# Patient Record
Sex: FEMALE | Race: OTHER | HISPANIC OR LATINO
[De-identification: names, ages, dates, MRNs, and addresses within clinical notes are randomized per-mention and may not be internally consistent; named-entity substitution may affect disease eponyms.]

---

## 2017-06-28 ENCOUNTER — APPOINTMENT (OUTPATIENT)
Dept: CARDIOLOGY | Facility: CLINIC | Age: 62
End: 2017-06-28

## 2017-07-26 ENCOUNTER — APPOINTMENT (OUTPATIENT)
Dept: CARDIOLOGY | Facility: CLINIC | Age: 62
End: 2017-07-26

## 2017-07-26 VITALS
DIASTOLIC BLOOD PRESSURE: 80 MMHG | WEIGHT: 170 LBS | HEIGHT: 64 IN | BODY MASS INDEX: 29.02 KG/M2 | SYSTOLIC BLOOD PRESSURE: 130 MMHG | HEART RATE: 52 BPM

## 2017-07-26 DIAGNOSIS — R06.02 SHORTNESS OF BREATH: ICD-10-CM

## 2017-08-03 ENCOUNTER — APPOINTMENT (OUTPATIENT)
Dept: CARDIOLOGY | Facility: CLINIC | Age: 62
End: 2017-08-03

## 2017-08-14 ENCOUNTER — APPOINTMENT (OUTPATIENT)
Dept: CARDIOLOGY | Facility: CLINIC | Age: 62
End: 2017-08-14

## 2021-07-04 ENCOUNTER — EMERGENCY (EMERGENCY)
Facility: HOSPITAL | Age: 66
LOS: 1 days | Discharge: HOME | End: 2021-07-04
Attending: EMERGENCY MEDICINE | Admitting: EMERGENCY MEDICINE
Payer: MEDICAID

## 2021-07-04 VITALS
OXYGEN SATURATION: 99 % | RESPIRATION RATE: 18 BRPM | SYSTOLIC BLOOD PRESSURE: 148 MMHG | TEMPERATURE: 99 F | DIASTOLIC BLOOD PRESSURE: 68 MMHG | HEART RATE: 70 BPM

## 2021-07-04 VITALS
RESPIRATION RATE: 17 BRPM | OXYGEN SATURATION: 100 % | TEMPERATURE: 96 F | HEART RATE: 63 BPM | DIASTOLIC BLOOD PRESSURE: 70 MMHG | SYSTOLIC BLOOD PRESSURE: 143 MMHG

## 2021-07-04 LAB
ALBUMIN SERPL ELPH-MCNC: 4.5 G/DL — SIGNIFICANT CHANGE UP (ref 3.3–5)
ALP SERPL-CCNC: 98 U/L — SIGNIFICANT CHANGE UP (ref 40–120)
ALT FLD-CCNC: 30 U/L — SIGNIFICANT CHANGE UP (ref 4–33)
ANION GAP SERPL CALC-SCNC: 16 MMOL/L — HIGH (ref 7–14)
APPEARANCE UR: ABNORMAL
AST SERPL-CCNC: 27 U/L — SIGNIFICANT CHANGE UP (ref 4–32)
BACTERIA # UR AUTO: ABNORMAL
BASOPHILS # BLD AUTO: 0.04 K/UL — SIGNIFICANT CHANGE UP (ref 0–0.2)
BASOPHILS NFR BLD AUTO: 0.4 % — SIGNIFICANT CHANGE UP (ref 0–2)
BILIRUB SERPL-MCNC: 0.3 MG/DL — SIGNIFICANT CHANGE UP (ref 0.2–1.2)
BILIRUB UR-MCNC: NEGATIVE — SIGNIFICANT CHANGE UP
BUN SERPL-MCNC: 15 MG/DL — SIGNIFICANT CHANGE UP (ref 7–23)
CALCIUM SERPL-MCNC: 9.2 MG/DL — SIGNIFICANT CHANGE UP (ref 8.4–10.5)
CHLORIDE SERPL-SCNC: 103 MMOL/L — SIGNIFICANT CHANGE UP (ref 98–107)
CO2 SERPL-SCNC: 20 MMOL/L — LOW (ref 22–31)
COLOR SPEC: YELLOW — SIGNIFICANT CHANGE UP
CREAT SERPL-MCNC: 1.36 MG/DL — HIGH (ref 0.5–1.3)
D DIMER BLD IA.RAPID-MCNC: <150 NG/ML DDU — SIGNIFICANT CHANGE UP
DIFF PNL FLD: NEGATIVE — SIGNIFICANT CHANGE UP
EOSINOPHIL # BLD AUTO: 0.43 K/UL — SIGNIFICANT CHANGE UP (ref 0–0.5)
EOSINOPHIL NFR BLD AUTO: 4.4 % — SIGNIFICANT CHANGE UP (ref 0–6)
EPI CELLS # UR: 1 /HPF — SIGNIFICANT CHANGE UP (ref 0–5)
GLUCOSE SERPL-MCNC: 152 MG/DL — HIGH (ref 70–99)
GLUCOSE UR QL: NEGATIVE — SIGNIFICANT CHANGE UP
HCT VFR BLD CALC: 42.3 % — SIGNIFICANT CHANGE UP (ref 34.5–45)
HGB BLD-MCNC: 13.3 G/DL — SIGNIFICANT CHANGE UP (ref 11.5–15.5)
HYALINE CASTS # UR AUTO: 1 /LPF — SIGNIFICANT CHANGE UP (ref 0–7)
IANC: 5.04 K/UL — SIGNIFICANT CHANGE UP (ref 1.5–8.5)
IMM GRANULOCYTES NFR BLD AUTO: 0.3 % — SIGNIFICANT CHANGE UP (ref 0–1.5)
KETONES UR-MCNC: NEGATIVE — SIGNIFICANT CHANGE UP
LEUKOCYTE ESTERASE UR-ACNC: NEGATIVE — SIGNIFICANT CHANGE UP
LYMPHOCYTES # BLD AUTO: 3.71 K/UL — HIGH (ref 1–3.3)
LYMPHOCYTES # BLD AUTO: 38.2 % — SIGNIFICANT CHANGE UP (ref 13–44)
MCHC RBC-ENTMCNC: 28.7 PG — SIGNIFICANT CHANGE UP (ref 27–34)
MCHC RBC-ENTMCNC: 31.4 GM/DL — LOW (ref 32–36)
MCV RBC AUTO: 91.4 FL — SIGNIFICANT CHANGE UP (ref 80–100)
MONOCYTES # BLD AUTO: 0.45 K/UL — SIGNIFICANT CHANGE UP (ref 0–0.9)
MONOCYTES NFR BLD AUTO: 4.6 % — SIGNIFICANT CHANGE UP (ref 2–14)
NEUTROPHILS # BLD AUTO: 5.04 K/UL — SIGNIFICANT CHANGE UP (ref 1.8–7.4)
NEUTROPHILS NFR BLD AUTO: 52.1 % — SIGNIFICANT CHANGE UP (ref 43–77)
NITRITE UR-MCNC: POSITIVE
NRBC # BLD: 0 /100 WBCS — SIGNIFICANT CHANGE UP
NRBC # FLD: 0 K/UL — SIGNIFICANT CHANGE UP
NT-PROBNP SERPL-SCNC: 52 PG/ML — SIGNIFICANT CHANGE UP
PH UR: 7 — SIGNIFICANT CHANGE UP (ref 5–8)
PLATELET # BLD AUTO: 241 K/UL — SIGNIFICANT CHANGE UP (ref 150–400)
POTASSIUM SERPL-MCNC: 3.7 MMOL/L — SIGNIFICANT CHANGE UP (ref 3.5–5.3)
POTASSIUM SERPL-SCNC: 3.7 MMOL/L — SIGNIFICANT CHANGE UP (ref 3.5–5.3)
PROT SERPL-MCNC: 7.6 G/DL — SIGNIFICANT CHANGE UP (ref 6–8.3)
PROT UR-MCNC: ABNORMAL
RBC # BLD: 4.63 M/UL — SIGNIFICANT CHANGE UP (ref 3.8–5.2)
RBC # FLD: 13.7 % — SIGNIFICANT CHANGE UP (ref 10.3–14.5)
RBC CASTS # UR COMP ASSIST: 3 /HPF — SIGNIFICANT CHANGE UP (ref 0–4)
SODIUM SERPL-SCNC: 139 MMOL/L — SIGNIFICANT CHANGE UP (ref 135–145)
SP GR SPEC: 1.02 — SIGNIFICANT CHANGE UP (ref 1.01–1.02)
TROPONIN T, HIGH SENSITIVITY RESULT: <6 NG/L — SIGNIFICANT CHANGE UP
UROBILINOGEN FLD QL: SIGNIFICANT CHANGE UP
WBC # BLD: 9.7 K/UL — SIGNIFICANT CHANGE UP (ref 3.8–10.5)
WBC # FLD AUTO: 9.7 K/UL — SIGNIFICANT CHANGE UP (ref 3.8–10.5)
WBC UR QL: SIGNIFICANT CHANGE UP /HPF (ref 0–5)

## 2021-07-04 PROCEDURE — 76705 ECHO EXAM OF ABDOMEN: CPT | Mod: 26

## 2021-07-04 PROCEDURE — 99285 EMERGENCY DEPT VISIT HI MDM: CPT

## 2021-07-04 PROCEDURE — 74176 CT ABD & PELVIS W/O CONTRAST: CPT | Mod: 26

## 2021-07-04 PROCEDURE — 71046 X-RAY EXAM CHEST 2 VIEWS: CPT | Mod: 26

## 2021-07-04 RX ORDER — SODIUM CHLORIDE 9 MG/ML
1000 INJECTION INTRAMUSCULAR; INTRAVENOUS; SUBCUTANEOUS ONCE
Refills: 0 | Status: COMPLETED | OUTPATIENT
Start: 2021-07-04 | End: 2021-07-04

## 2021-07-04 RX ORDER — CEPHALEXIN 500 MG
1 CAPSULE ORAL
Qty: 20 | Refills: 0
Start: 2021-07-04 | End: 2021-07-13

## 2021-07-04 RX ORDER — KETOROLAC TROMETHAMINE 30 MG/ML
15 SYRINGE (ML) INJECTION ONCE
Refills: 0 | Status: DISCONTINUED | OUTPATIENT
Start: 2021-07-04 | End: 2021-07-04

## 2021-07-04 RX ADMIN — Medication 15 MILLIGRAM(S): at 19:50

## 2021-07-04 RX ADMIN — SODIUM CHLORIDE 1000 MILLILITER(S): 9 INJECTION INTRAMUSCULAR; INTRAVENOUS; SUBCUTANEOUS at 19:50

## 2021-07-04 NOTE — ED PROVIDER NOTE - CLINICAL SUMMARY MEDICAL DECISION MAKING FREE TEXT BOX
Baldemar: Sudden R thoracic back pain w/ N and difficulty breathing 2/2 pain. DDx: gallstones, renal stones (R CVAT), MSK pain, less likely PE (HR 70, EKG unremarkable, RA SaO2 100%). Check CT/Cr/UA for renal stone; RUQ US for gallstone; UA for source of pedal edema; CXR/trop/BNP for cardiac cause of pedal edema and difficulty breathing. Pain and nausea meds.

## 2021-07-04 NOTE — ED PROVIDER NOTE - NSFOLLOWUPINSTRUCTIONS_ED_ALL_ED_FT
- Please make an appointment with General Surgery for further evaluation of your gallstones.    -Tylenol up to 650 mg every 8 hours as needed for pain and/or Motrin up to 600 mg every 6 hours as needed for pain. Take any prescribed medications as instructed:       Gallstones (cholelithiasis) is a form of gallbladder disease in which stones form in your gallbladder. The gallbladder is an organ that stores bile made in the liver, which helps digest fats. Gallstones begin as small bile crystals and slowly grow into stones. Gallstone pain occurs when the gallbladder spasms and a gallstone or sludge is blocking the duct. Pain can also occur when a stone passes out of the duct. Only take over-the-counter or prescription medicines for pain, discomfort, or fever as directed by your health care provider. Follow a low-fat diet until seen again by your health care provider.     SEEK IMMEDIATE MEDICAL CARE IF YOU HAVE ANY OF THE FOLLOWING SYMPTOMS: worsening pain, fever, persistent vomiting, yellowing of the skin or eyes, or altered mental status.

## 2021-07-04 NOTE — ED ADULT NURSE NOTE - PRIMARY CARE PROVIDER
Assessment /Plan     For exam results, see Encounter Report.    Bilateral dry eyes  -     loteprednol (LOTEMAX) 0.5 % ophthalmic suspension; Place 1 drop into both eyes 4 (four) times daily. for 10 days  Dispense: 5 mL; Refill: 0      1. Sent lotemax drop to pharmacy per pt request for dry eye irritation. RTC or call if no better after 1 week.                   unk

## 2021-07-04 NOTE — ED PROVIDER NOTE - OBJECTIVE STATEMENT
Baldemar: HL (Zocor), hemorrhoids. Tonight, sudden-onset R thoracic back pain after eating, feels like there's a squeezing inside. N; no /V/sweat/F/D. Difficulty taking deep breath. No CP. Hasn't taken meds to relieve the pain. Awoke tired this AM. 2 wks (B) pedal edema.

## 2021-07-04 NOTE — ED PROVIDER NOTE - PATIENT PORTAL LINK FT
You can access the FollowMyHealth Patient Portal offered by [facility] by registering at the following website: [email or web address]. By joining [facility]’s FollowMyHealth portal, you will also be able to view your health information using other applications (apps) compatible with our system.

## 2021-07-04 NOTE — ED PROVIDER NOTE - NS ED ROS FT
General: denies fever, chills  HENT: denies nasal congestion, rhinorrhea  Eyes: denies visual changes, blurred vision  CV: denies chest pain, palpitations  Resp: denies difficulty breathing, cough  Abdominal: denies nausea, vomiting, diarrhea, abdominal pain  : denies urinary pain or discharge  MSK: right sided back pain + b/l LE edema  Neuro: denies headaches, numbness, tingling  Skin: denies rashes, bruises

## 2021-07-04 NOTE — ED PROVIDER NOTE - ATTENDING CONTRIBUTION TO CARE
I performed a face-to-face evaluation of the patient and performed a history and physical examination. I agree with the history and physical examination.    Baldemar: Sudden R thoracic back pain w/ N and difficulty breathing 2/2 pain. DDx: gallstones, renal stones (R CVAT), MSK pain, less likely PE (HR 70, EKG unremarkable, RA SaO2 100%). Check CT/Cr/UA for renal stone; RUQ US for gallstone; UA for source of pedal edema; CXR/trop/BNP for cardiac cause of pedal edema and difficulty breathing. Pain and nausea meds.

## 2021-07-04 NOTE — ED PROVIDER NOTE - PROGRESS NOTE DETAILS
Guido, PGY2: Patient reassessed and noting moderate symptomatic improvement; will continue to reassess and await labs + imaging Guido, PGY2: Patient reassessed and continues to note symptomatic improvement; awaiting CTr + USr Guido, PGY2: Patient reassessed and continues to be asymptomatic and pain free. US + CTAP notable for cholelithiasis but no evidence of cholecystitis or nephrolithasis. Will D/c with dietary modification + general surgery follow up. Strict return precautions provided and patient and family member understand and agree w/ plan  Of note, CT impression notes "evidence of bowel instruction" but body does not indicate bowel instruction; discussed with radiology resident and impression will be amended to reflect that there is no evidence of bowel obstruction

## 2021-07-04 NOTE — ED ADULT NURSE NOTE - NSIMPLEMENTINTERV_GEN_ALL_ED
Implemented All Universal Safety Interventions:  [place] to call system. Call bell, personal items and telephone within reach. Instruct patient to call for assistance. Room bathroom lighting operational. Non-slip footwear when patient is off stretcher. Physically safe environment: no spills, clutter or unnecessary equipment. Stretcher in lowest position, wheels locked, appropriate side rails in place.

## 2021-07-04 NOTE — ED ADULT NURSE REASSESSMENT NOTE - NS ED NURSE REASSESS COMMENT FT1
Report received from dayshift RN. Pt aaox4 and ambulatory. Pt denies abdominal pain, N+V, diarrhea. Vital signs reassessed as noted. Pt awaiting USr. Will continue to monitor.

## 2021-07-04 NOTE — ED ADULT NURSE NOTE - OBJECTIVE STATEMENT
Pt received AOx4, ambulatory at baseline w. pmhx of borderline DM, hysterectomy, anemia requiring transfusions in the past presents to the ED w/ chief complaint of right upper back near the scapula that started soon after finishing doctor. Pt endorses nausea but no vomiting. Pt states the pain is worse w/ taking deep breaths. Breathing even and unlabored, saturating 100% on RA. Denies chills, fever, cough, diarrhea at this moment. 20G placed in RAC. Meds given per Md order.

## 2021-07-04 NOTE — ED ADULT TRIAGE NOTE - CHIEF COMPLAINT QUOTE
Pt complaining of back pain on the right side radiating to her shoulder. Pt denies chest pain, sob, n/v/d, fever or chills.

## 2022-03-01 PROBLEM — E78.5 HYPERLIPIDEMIA, UNSPECIFIED: Chronic | Status: ACTIVE | Noted: 2021-07-04

## 2022-03-04 DIAGNOSIS — E78.6 LIPOPROTEIN DEFICIENCY: ICD-10-CM

## 2022-03-04 DIAGNOSIS — Z87.09 PERSONAL HISTORY OF OTHER DISEASES OF THE RESPIRATORY SYSTEM: ICD-10-CM

## 2022-03-04 DIAGNOSIS — Z86.39 PERSONAL HISTORY OF OTHER ENDOCRINE, NUTRITIONAL AND METABOLIC DISEASE: ICD-10-CM

## 2022-03-04 DIAGNOSIS — K21.9 GASTRO-ESOPHAGEAL REFLUX DISEASE W/OUT ESOPHAGITIS: ICD-10-CM

## 2022-03-04 DIAGNOSIS — Z87.898 PERSONAL HISTORY OF OTHER SPECIFIED CONDITIONS: ICD-10-CM

## 2022-03-04 DIAGNOSIS — Z87.81 PERSONAL HISTORY OF (HEALED) TRAUMATIC FRACTURE: ICD-10-CM

## 2022-03-04 DIAGNOSIS — K80.20 CALCULUS OF GALLBLADDER W/OUT CHOLECYSTITIS W/OUT OBSTRUCTION: ICD-10-CM

## 2022-03-04 DIAGNOSIS — Z80.0 FAMILY HISTORY OF MALIGNANT NEOPLASM OF DIGESTIVE ORGANS: ICD-10-CM

## 2022-03-04 DIAGNOSIS — Z86.79 PERSONAL HISTORY OF OTHER DISEASES OF THE CIRCULATORY SYSTEM: ICD-10-CM

## 2022-03-04 DIAGNOSIS — K62.5 HEMORRHAGE OF ANUS AND RECTUM: ICD-10-CM

## 2022-03-04 DIAGNOSIS — Z83.3 FAMILY HISTORY OF DIABETES MELLITUS: ICD-10-CM

## 2022-03-04 RX ORDER — SIMVASTATIN 5 MG/1
5 TABLET, FILM COATED ORAL DAILY
Refills: 0 | Status: ACTIVE | COMMUNITY

## 2022-03-12 ENCOUNTER — LABORATORY RESULT (OUTPATIENT)
Age: 67
End: 2022-03-12

## 2022-03-12 ENCOUNTER — NON-APPOINTMENT (OUTPATIENT)
Age: 67
End: 2022-03-12

## 2022-03-12 ENCOUNTER — APPOINTMENT (OUTPATIENT)
Dept: FAMILY MEDICINE | Facility: CLINIC | Age: 67
End: 2022-03-12
Payer: MEDICAID

## 2022-03-12 VITALS
BODY MASS INDEX: 29.02 KG/M2 | WEIGHT: 170 LBS | DIASTOLIC BLOOD PRESSURE: 70 MMHG | HEIGHT: 64 IN | SYSTOLIC BLOOD PRESSURE: 120 MMHG | TEMPERATURE: 97.5 F

## 2022-03-12 DIAGNOSIS — R29.898 OTHER SYMPTOMS AND SIGNS INVOLVING THE MUSCULOSKELETAL SYSTEM: ICD-10-CM

## 2022-03-12 DIAGNOSIS — E78.1 PURE HYPERGLYCERIDEMIA: ICD-10-CM

## 2022-03-12 DIAGNOSIS — R07.9 CHEST PAIN, UNSPECIFIED: ICD-10-CM

## 2022-03-12 PROCEDURE — 99214 OFFICE O/P EST MOD 30 MIN: CPT | Mod: 25

## 2022-03-12 PROCEDURE — 93000 ELECTROCARDIOGRAM COMPLETE: CPT

## 2022-03-12 NOTE — HISTORY OF PRESENT ILLNESS
[de-identified] : Pt came back for q 3 months follow up. Pt has been taking all her medications, needs Rxs for all medications and blood tests today. \par Right breast sonogram was unremarkable\par Pt complained 1, middle chest pain when she walked with sob. Pt saw cardiologist in 7/21, echocardiagram and stress test unremarkable. 2, left upper abdominal pain after eating, mainly after breakfast. No vomiting and diarrhea. Pt had h/o cholecystectomy. 3, both legs heaviness when she walked. No injury. Pt didn't take simvastatin at all. \par

## 2022-03-13 LAB
ALBUMIN SERPL ELPH-MCNC: 4.7 G/DL
ALP BLD-CCNC: 70 U/L
ALT SERPL-CCNC: 18 U/L
ANION GAP SERPL CALC-SCNC: 12 MMOL/L
APPEARANCE: ABNORMAL
AST SERPL-CCNC: 17 U/L
BASOPHILS # BLD AUTO: 0.03 K/UL
BASOPHILS NFR BLD AUTO: 0.6 %
BILIRUB SERPL-MCNC: 0.6 MG/DL
BILIRUBIN URINE: NEGATIVE
BLOOD URINE: NEGATIVE
BUN SERPL-MCNC: 12 MG/DL
CALCIUM SERPL-MCNC: 9.2 MG/DL
CHLORIDE SERPL-SCNC: 106 MMOL/L
CHOLEST SERPL-MCNC: 196 MG/DL
CO2 SERPL-SCNC: 23 MMOL/L
COLOR: ABNORMAL
CREAT SERPL-MCNC: 0.74 MG/DL
EGFR: 90 ML/MIN/1.73M2
EOSINOPHIL # BLD AUTO: 0.27 K/UL
EOSINOPHIL NFR BLD AUTO: 5.2 %
ESTIMATED AVERAGE GLUCOSE: 131 MG/DL
GLUCOSE QUALITATIVE U: NEGATIVE
GLUCOSE SERPL-MCNC: 107 MG/DL
HBA1C MFR BLD HPLC: 6.2 %
HCT VFR BLD CALC: 43.2 %
HDLC SERPL-MCNC: 50 MG/DL
HGB BLD-MCNC: 13.5 G/DL
IMM GRANULOCYTES NFR BLD AUTO: 0 %
KETONES URINE: NEGATIVE
LDLC SERPL CALC-MCNC: 120 MG/DL
LEUKOCYTE ESTERASE URINE: NEGATIVE
LYMPHOCYTES # BLD AUTO: 2.07 K/UL
LYMPHOCYTES NFR BLD AUTO: 40.1 %
MAN DIFF?: NORMAL
MCHC RBC-ENTMCNC: 28.7 PG
MCHC RBC-ENTMCNC: 31.3 GM/DL
MCV RBC AUTO: 91.7 FL
MONOCYTES # BLD AUTO: 0.28 K/UL
MONOCYTES NFR BLD AUTO: 5.4 %
NEUTROPHILS # BLD AUTO: 2.51 K/UL
NEUTROPHILS NFR BLD AUTO: 48.7 %
NITRITE URINE: NEGATIVE
NONHDLC SERPL-MCNC: 146 MG/DL
PH URINE: 6
PLATELET # BLD AUTO: 239 K/UL
POTASSIUM SERPL-SCNC: 4.8 MMOL/L
PROT SERPL-MCNC: 7.2 G/DL
PROTEIN URINE: NEGATIVE
RBC # BLD: 4.71 M/UL
RBC # FLD: 14 %
SODIUM SERPL-SCNC: 141 MMOL/L
SPECIFIC GRAVITY URINE: 1.02
T3 SERPL-MCNC: 94 NG/DL
T3FREE SERPL-MCNC: 3 PG/ML
T4 FREE SERPL-MCNC: 1.3 NG/DL
T4 SERPL-MCNC: 8.1 UG/DL
TRIGL SERPL-MCNC: 130 MG/DL
TSH SERPL-ACNC: 2.59 UIU/ML
URATE SERPL-MCNC: 4.4 MG/DL
UROBILINOGEN URINE: NORMAL
WBC # FLD AUTO: 5.16 K/UL

## 2022-03-15 LAB — 24R-OH-CALCIDIOL SERPL-MCNC: 78.5 PG/ML

## 2022-04-02 ENCOUNTER — APPOINTMENT (OUTPATIENT)
Dept: FAMILY MEDICINE | Facility: CLINIC | Age: 67
End: 2022-04-02
Payer: MEDICAID

## 2022-04-02 DIAGNOSIS — N64.4 MASTODYNIA: ICD-10-CM

## 2022-04-02 PROCEDURE — 99441: CPT

## 2022-04-02 NOTE — HISTORY OF PRESENT ILLNESS
[Home] : at home, [unfilled] , at the time of the visit. [Medical Office: ([facility])___] : at the medical office located in  [Verbal consent obtained from patient] : the patient, [unfilled] [de-identified] : Fasting glucose was 107, HBA1c 6.2, estimated glucose 128, , HDL 50, non-HDL cholesterol 146, Pt was recalled for abnormal test reports. Reports were reviewed with pt in details. Other blood tests came back wnl. Pt never took simvastatin.

## 2022-04-02 NOTE — PLAN
[FreeTextEntry1] : \par Pt was fully explained her diagnosis, treatment plan and goal of treatment today on the phone for 10 minutes.

## 2022-04-02 NOTE — HISTORY OF PRESENT ILLNESS
[Home] : at home, [unfilled] , at the time of the visit. [Medical Office: ([facility])___] : at the medical office located in  [Verbal consent obtained from patient] : the patient, [unfilled] [de-identified] : Fasting glucose was 107, HBA1c 6.2, estimated glucose 128, , HDL 50, non-HDL cholesterol 146, Pt was recalled for abnormal test reports. Reports were reviewed with pt in details. Other blood tests came back wnl. Pt never took simvastatin.

## 2022-05-05 ENCOUNTER — APPOINTMENT (OUTPATIENT)
Dept: FAMILY MEDICINE | Facility: CLINIC | Age: 67
End: 2022-05-05
Payer: MEDICAID

## 2022-05-05 PROCEDURE — 99441: CPT

## 2022-05-05 NOTE — HISTORY OF PRESENT ILLNESS
[Home] : at home, [unfilled] , at the time of the visit. [Medical Office: ([facility])___] : at the medical office located in  [Verbal consent obtained from patient] : the patient, [unfilled] [FreeTextEntry8] : Pt complained 2 days history of urinary burning, frequency and urgency, no blood in the urine and no fever. Pt denied abdominal pain and flank pain. Pt had UTI episodes before.\par

## 2022-07-02 ENCOUNTER — APPOINTMENT (OUTPATIENT)
Dept: FAMILY MEDICINE | Facility: CLINIC | Age: 67
End: 2022-07-02

## 2022-07-02 VITALS
TEMPERATURE: 97.3 F | WEIGHT: 166 LBS | SYSTOLIC BLOOD PRESSURE: 117 MMHG | DIASTOLIC BLOOD PRESSURE: 76 MMHG | HEART RATE: 56 BPM | HEIGHT: 64 IN | BODY MASS INDEX: 28.34 KG/M2 | OXYGEN SATURATION: 97 %

## 2022-07-02 DIAGNOSIS — Z00.00 ENCOUNTER FOR GENERAL ADULT MEDICAL EXAMINATION W/OUT ABNORMAL FINDINGS: ICD-10-CM

## 2022-07-02 DIAGNOSIS — R19.7 DIARRHEA, UNSPECIFIED: ICD-10-CM

## 2022-07-02 DIAGNOSIS — R11.2 NAUSEA WITH VOMITING, UNSPECIFIED: ICD-10-CM

## 2022-07-02 DIAGNOSIS — R10.9 UNSPECIFIED ABDOMINAL PAIN: ICD-10-CM

## 2022-07-02 PROCEDURE — 99213 OFFICE O/P EST LOW 20 MIN: CPT | Mod: 25

## 2022-07-02 PROCEDURE — 36415 COLL VENOUS BLD VENIPUNCTURE: CPT

## 2022-07-02 RX ORDER — METOCLOPRAMIDE 10 MG/1
10 TABLET ORAL 3 TIMES DAILY
Qty: 21 | Refills: 0 | Status: ACTIVE | COMMUNITY
Start: 2022-07-02 | End: 1900-01-01

## 2022-07-02 RX ORDER — CIPROFLOXACIN HYDROCHLORIDE 500 MG/1
500 TABLET, FILM COATED ORAL
Qty: 14 | Refills: 0 | Status: DISCONTINUED | COMMUNITY
Start: 2022-05-05 | End: 2022-07-02

## 2022-07-02 NOTE — HISTORY OF PRESENT ILLNESS
[de-identified] : Pt developed vomiting in 3 days ago and diarrhea since yesterday with upper middle to left upper abdomen pain. Today pt woke up feeling little better, no vomiting and diarrhea, but still has abdominal pain. Pt also had low grade fever around 100 F. Pt denied coughing, sore throat and headache. Pt had covid testing that was negative.

## 2022-07-04 LAB
25(OH)D3 SERPL-MCNC: 39.9 NG/ML
ALBUMIN SERPL ELPH-MCNC: 4.8 G/DL
ALP BLD-CCNC: 85 U/L
ALT SERPL-CCNC: 28 U/L
ANION GAP SERPL CALC-SCNC: 11 MMOL/L
AST SERPL-CCNC: 23 U/L
BILIRUB SERPL-MCNC: 0.4 MG/DL
BUN SERPL-MCNC: 11 MG/DL
CALCIUM SERPL-MCNC: 8.7 MG/DL
CHLORIDE SERPL-SCNC: 105 MMOL/L
CHOLEST SERPL-MCNC: 146 MG/DL
CO2 SERPL-SCNC: 24 MMOL/L
CREAT SERPL-MCNC: 0.76 MG/DL
EGFR: 87 ML/MIN/1.73M2
ESTIMATED AVERAGE GLUCOSE: 137 MG/DL
GLUCOSE SERPL-MCNC: 102 MG/DL
HBA1C MFR BLD HPLC: 6.4 %
HDLC SERPL-MCNC: 43 MG/DL
LDLC SERPL CALC-MCNC: 78 MG/DL
NONHDLC SERPL-MCNC: 103 MG/DL
POTASSIUM SERPL-SCNC: 4.3 MMOL/L
PROT SERPL-MCNC: 7.2 G/DL
SODIUM SERPL-SCNC: 139 MMOL/L
T3 SERPL-MCNC: 91 NG/DL
T3FREE SERPL-MCNC: 2.62 PG/ML
T4 FREE SERPL-MCNC: 1.2 NG/DL
T4 SERPL-MCNC: 7.9 UG/DL
TRIGL SERPL-MCNC: 127 MG/DL
TSH SERPL-ACNC: 5.12 UIU/ML

## 2022-07-08 PROBLEM — N39.0 URINARY TRACT INFECTION: Status: ACTIVE | Noted: 2022-05-05

## 2022-07-08 LAB — BACTERIA UR CULT: ABNORMAL

## 2022-07-09 ENCOUNTER — APPOINTMENT (OUTPATIENT)
Dept: FAMILY MEDICINE | Facility: CLINIC | Age: 67
End: 2022-07-09

## 2022-07-09 DIAGNOSIS — N39.0 URINARY TRACT INFECTION, SITE NOT SPECIFIED: ICD-10-CM

## 2022-07-09 PROCEDURE — 99213 OFFICE O/P EST LOW 20 MIN: CPT

## 2022-07-09 NOTE — HISTORY OF PRESENT ILLNESS
[de-identified] : Fasting glucose was 102, HDL 43, TSH 5.12, HBA1c 6.4, estimated glucose 137 and urine culture positive for 10, 000-49, 000 enterococcus faecalis, sensitive to cipro. Pt was recalled back for abnormal test reports. Reports were reviewed with pt in details. Other blood tests came back wnl. Pt has been taking all her medications.\par \par \par

## 2022-07-30 ENCOUNTER — APPOINTMENT (OUTPATIENT)
Dept: FAMILY MEDICINE | Facility: CLINIC | Age: 67
End: 2022-07-30

## 2022-10-18 PROBLEM — K59.00 CONSTIPATION: Status: ACTIVE | Noted: 2022-03-04

## 2022-10-22 ENCOUNTER — APPOINTMENT (OUTPATIENT)
Dept: FAMILY MEDICINE | Facility: CLINIC | Age: 67
End: 2022-10-22

## 2022-10-22 DIAGNOSIS — K59.00 CONSTIPATION, UNSPECIFIED: ICD-10-CM

## 2022-10-22 DIAGNOSIS — Z12.11 ENCOUNTER FOR SCREENING FOR MALIGNANT NEOPLASM OF COLON: ICD-10-CM

## 2022-10-28 PROBLEM — Z00.01 ENCOUNTER FOR WELL ADULT EXAM WITH ABNORMAL FINDINGS: Status: ACTIVE | Noted: 2022-10-18

## 2022-10-29 ENCOUNTER — APPOINTMENT (OUTPATIENT)
Dept: FAMILY MEDICINE | Facility: CLINIC | Age: 67
End: 2022-10-29

## 2022-10-29 VITALS
HEIGHT: 64 IN | TEMPERATURE: 97.3 F | WEIGHT: 169 LBS | DIASTOLIC BLOOD PRESSURE: 70 MMHG | OXYGEN SATURATION: 99 % | BODY MASS INDEX: 28.85 KG/M2 | SYSTOLIC BLOOD PRESSURE: 136 MMHG | HEART RATE: 56 BPM

## 2022-10-29 DIAGNOSIS — R10.13 EPIGASTRIC PAIN: ICD-10-CM

## 2022-10-29 DIAGNOSIS — Z12.39 ENCOUNTER FOR OTHER SCREENING FOR MALIGNANT NEOPLASM OF BREAST: ICD-10-CM

## 2022-10-29 DIAGNOSIS — Z00.01 ENCOUNTER FOR GENERAL ADULT MEDICAL EXAMINATION WITH ABNORMAL FINDINGS: ICD-10-CM

## 2022-10-29 DIAGNOSIS — M79.2 NEURALGIA AND NEURITIS, UNSPECIFIED: ICD-10-CM

## 2022-10-29 PROCEDURE — 99397 PER PM REEVAL EST PAT 65+ YR: CPT | Mod: 25

## 2022-10-29 PROCEDURE — 36415 COLL VENOUS BLD VENIPUNCTURE: CPT

## 2022-10-29 RX ORDER — FAMOTIDINE 20 MG/1
20 TABLET, FILM COATED ORAL
Qty: 60 | Refills: 1 | Status: ACTIVE | COMMUNITY
Start: 2022-07-02 | End: 1900-01-01

## 2022-10-29 RX ORDER — ASPIRIN ENTERIC COATED TABLETS 81 MG 81 MG/1
81 TABLET, DELAYED RELEASE ORAL DAILY
Qty: 30 | Refills: 3 | Status: DISCONTINUED | COMMUNITY
Start: 2022-03-12 | End: 2022-10-29

## 2022-10-29 RX ORDER — LOPERAMIDE HYDROCHLORIDE 2 MG/1
2 CAPSULE ORAL
Qty: 10 | Refills: 0 | Status: DISCONTINUED | COMMUNITY
Start: 2022-07-02 | End: 2022-10-29

## 2022-10-29 RX ORDER — CIPROFLOXACIN HYDROCHLORIDE 500 MG/1
500 TABLET, FILM COATED ORAL
Qty: 14 | Refills: 0 | Status: DISCONTINUED | COMMUNITY
Start: 2022-07-09 | End: 2022-10-29

## 2022-10-29 NOTE — HISTORY OF PRESENT ILLNESS
[de-identified] : Pt came to office for annual physical exam today.\par Pt requested Rx of pepcid because sometime she has upset stomach after eating certain food. EGD in 4/2022 was unremarkable. \par Pt requested referral for physical therapy because she felt burning sensation from thigh down to ankle and she also has knee pain.

## 2022-10-31 LAB
25(OH)D3 SERPL-MCNC: 31.8 NG/ML
ALBUMIN SERPL ELPH-MCNC: 4.4 G/DL
ALP BLD-CCNC: 86 U/L
ALT SERPL-CCNC: 21 U/L
ANION GAP SERPL CALC-SCNC: 18 MMOL/L
APPEARANCE: CLEAR
AST SERPL-CCNC: 17 U/L
BASOPHILS # BLD AUTO: 0.04 K/UL
BASOPHILS NFR BLD AUTO: 0.6 %
BILIRUB SERPL-MCNC: 0.3 MG/DL
BILIRUBIN URINE: NEGATIVE
BLOOD URINE: NEGATIVE
BUN SERPL-MCNC: 19 MG/DL
CALCIUM SERPL-MCNC: 9.4 MG/DL
CHLORIDE SERPL-SCNC: 105 MMOL/L
CHOLEST SERPL-MCNC: 173 MG/DL
CO2 SERPL-SCNC: 20 MMOL/L
COLOR: NORMAL
CREAT SERPL-MCNC: 0.79 MG/DL
CREAT SPEC-SCNC: 102 MG/DL
EGFR: 83 ML/MIN/1.73M2
EOSINOPHIL # BLD AUTO: 0.42 K/UL
EOSINOPHIL NFR BLD AUTO: 6 %
ESTIMATED AVERAGE GLUCOSE: 134 MG/DL
GLUCOSE QUALITATIVE U: NEGATIVE
GLUCOSE SERPL-MCNC: 114 MG/DL
HBA1C MFR BLD HPLC: 6.3 %
HCT VFR BLD CALC: 42.6 %
HDLC SERPL-MCNC: 50 MG/DL
HGB BLD-MCNC: 13.5 G/DL
IMM GRANULOCYTES NFR BLD AUTO: 0.1 %
KETONES URINE: NEGATIVE
LDLC SERPL CALC-MCNC: 98 MG/DL
LEUKOCYTE ESTERASE URINE: NEGATIVE
LYMPHOCYTES # BLD AUTO: 2.39 K/UL
LYMPHOCYTES NFR BLD AUTO: 34 %
MAN DIFF?: NORMAL
MCHC RBC-ENTMCNC: 29.6 PG
MCHC RBC-ENTMCNC: 31.7 GM/DL
MCV RBC AUTO: 93.4 FL
MICROALBUMIN 24H UR DL<=1MG/L-MCNC: <1.2 MG/DL
MICROALBUMIN/CREAT 24H UR-RTO: NORMAL MG/G
MONOCYTES # BLD AUTO: 0.32 K/UL
MONOCYTES NFR BLD AUTO: 4.6 %
NEUTROPHILS # BLD AUTO: 3.85 K/UL
NEUTROPHILS NFR BLD AUTO: 54.7 %
NITRITE URINE: NEGATIVE
NONHDLC SERPL-MCNC: 123 MG/DL
PH URINE: 6
PLATELET # BLD AUTO: 256 K/UL
POTASSIUM SERPL-SCNC: 4.6 MMOL/L
PROT SERPL-MCNC: 7.2 G/DL
PROTEIN URINE: NEGATIVE
RBC # BLD: 4.56 M/UL
RBC # FLD: 14.3 %
SODIUM SERPL-SCNC: 143 MMOL/L
SPECIFIC GRAVITY URINE: 1.02
T3 SERPL-MCNC: 98 NG/DL
T3FREE SERPL-MCNC: 2.95 PG/ML
T4 FREE SERPL-MCNC: 1.3 NG/DL
T4 SERPL-MCNC: 7.7 UG/DL
TRIGL SERPL-MCNC: 126 MG/DL
TSH SERPL-ACNC: 3.06 UIU/ML
URATE SERPL-MCNC: 5.1 MG/DL
UROBILINOGEN URINE: NORMAL
WBC # FLD AUTO: 7.03 K/UL

## 2022-11-05 ENCOUNTER — APPOINTMENT (OUTPATIENT)
Dept: FAMILY MEDICINE | Facility: CLINIC | Age: 67
End: 2022-11-05

## 2022-11-05 PROCEDURE — 99214 OFFICE O/P EST MOD 30 MIN: CPT

## 2022-11-05 RX ORDER — LORATADINE 10 MG/1
10 TABLET ORAL DAILY
Qty: 30 | Refills: 3 | Status: ACTIVE | COMMUNITY
Start: 1900-01-01 | End: 1900-01-01

## 2022-11-05 RX ORDER — OMEGA-3/DHA/EPA/FISH OIL 300-1000MG
1000 CAPSULE ORAL
Qty: 60 | Refills: 3 | Status: ACTIVE | COMMUNITY
Start: 1900-01-01 | End: 1900-01-01

## 2022-11-05 RX ORDER — MONTELUKAST 10 MG/1
10 TABLET, FILM COATED ORAL DAILY
Refills: 0 | Status: DISCONTINUED | COMMUNITY
End: 2022-11-05

## 2022-11-05 NOTE — HISTORY OF PRESENT ILLNESS
[de-identified] : HBA1c was 6.3, estimated glucose 134, fasting glucose 114, HDL 50,. Pt was recalled back for abnormal test reports. Reports were reviewed with pt in details. Other blood tests came back wnl. \par

## 2023-02-06 PROBLEM — J30.9 ALLERGIC RHINITIS: Status: ACTIVE | Noted: 2022-03-12

## 2023-02-11 ENCOUNTER — APPOINTMENT (OUTPATIENT)
Dept: FAMILY MEDICINE | Facility: CLINIC | Age: 68
End: 2023-02-11
Payer: MEDICAID

## 2023-02-11 ENCOUNTER — NON-APPOINTMENT (OUTPATIENT)
Age: 68
End: 2023-02-11

## 2023-02-11 VITALS
WEIGHT: 173 LBS | DIASTOLIC BLOOD PRESSURE: 83 MMHG | HEIGHT: 64 IN | OXYGEN SATURATION: 96 % | SYSTOLIC BLOOD PRESSURE: 139 MMHG | TEMPERATURE: 97.2 F | BODY MASS INDEX: 29.53 KG/M2 | HEART RATE: 70 BPM

## 2023-02-11 DIAGNOSIS — Z23 ENCOUNTER FOR IMMUNIZATION: ICD-10-CM

## 2023-02-11 DIAGNOSIS — R94.31 ABNORMAL ELECTROCARDIOGRAM [ECG] [EKG]: ICD-10-CM

## 2023-02-11 DIAGNOSIS — J30.9 ALLERGIC RHINITIS, UNSPECIFIED: ICD-10-CM

## 2023-02-11 PROCEDURE — G0009: CPT

## 2023-02-11 PROCEDURE — 99214 OFFICE O/P EST MOD 30 MIN: CPT | Mod: 25

## 2023-02-11 PROCEDURE — 90677 PCV20 VACCINE IM: CPT

## 2023-02-11 PROCEDURE — 93000 ELECTROCARDIOGRAM COMPLETE: CPT

## 2023-02-11 RX ORDER — IBUPROFEN 400 MG/1
400 TABLET, FILM COATED ORAL
Qty: 30 | Refills: 3 | Status: ACTIVE | COMMUNITY
Start: 2023-02-11 | End: 1900-01-01

## 2023-02-11 NOTE — PHYSICAL EXAM
[Normal] : normal gait, coordination grossly intact, no focal deficits and deep tendon reflexes were 2+ and symmetric [de-identified] : Left knee: swelling, non tender

## 2023-02-11 NOTE — HISTORY OF PRESENT ILLNESS
[de-identified] : Pt came back for q 3 months follow up. Pt has been taking all her medications, needs Rxs for all medications and blood tests today. \par Pt has chronic left knee pain and received physical therapy. Physical therapy had to be stopped because [place]. Left knee pain continued, swollen now. It interfered her work.

## 2023-02-15 ENCOUNTER — APPOINTMENT (OUTPATIENT)
Dept: CARDIOLOGY | Facility: CLINIC | Age: 68
End: 2023-02-15

## 2023-02-18 ENCOUNTER — LABORATORY RESULT (OUTPATIENT)
Age: 68
End: 2023-02-18

## 2023-02-18 ENCOUNTER — APPOINTMENT (OUTPATIENT)
Dept: FAMILY MEDICINE | Facility: CLINIC | Age: 68
End: 2023-02-18
Payer: MEDICAID

## 2023-02-18 DIAGNOSIS — M25.569 PAIN IN UNSPECIFIED KNEE: ICD-10-CM

## 2023-02-18 PROCEDURE — 99212 OFFICE O/P EST SF 10 MIN: CPT

## 2023-02-18 NOTE — HISTORY OF PRESENT ILLNESS
[de-identified] : X ray left knee showed mild patellofemoral osteoarthritis with small osteophytes. A small suprapatellar  effusion present. Results were discussed with pt in details today.\par Pt still has left knee pain. Ibuprofen didn't help her at all. Pt will start physical therapy today.

## 2023-02-21 LAB
25(OH)D3 SERPL-MCNC: 34.8 NG/ML
ALBUMIN SERPL ELPH-MCNC: 4.7 G/DL
ALP BLD-CCNC: 76 U/L
ALT SERPL-CCNC: 23 U/L
ANION GAP SERPL CALC-SCNC: 12 MMOL/L
APPEARANCE: CLEAR
AST SERPL-CCNC: 22 U/L
BASOPHILS # BLD AUTO: 0.06 K/UL
BASOPHILS NFR BLD AUTO: 1 %
BILIRUB SERPL-MCNC: 0.6 MG/DL
BILIRUBIN URINE: NEGATIVE
BLOOD URINE: NEGATIVE
BUN SERPL-MCNC: 14 MG/DL
CALCIUM SERPL-MCNC: 9.4 MG/DL
CHLORIDE SERPL-SCNC: 102 MMOL/L
CHOLEST SERPL-MCNC: 167 MG/DL
CO2 SERPL-SCNC: 25 MMOL/L
COLOR: NORMAL
CREAT SERPL-MCNC: 0.78 MG/DL
CREAT SPEC-SCNC: 78 MG/DL
EGFR: 84 ML/MIN/1.73M2
EOSINOPHIL # BLD AUTO: 0.28 K/UL
EOSINOPHIL NFR BLD AUTO: 4.6 %
ESTIMATED AVERAGE GLUCOSE: 140 MG/DL
GLUCOSE QUALITATIVE U: NEGATIVE
GLUCOSE SERPL-MCNC: 108 MG/DL
HBA1C MFR BLD HPLC: 6.5 %
HCT VFR BLD CALC: 44.5 %
HDLC SERPL-MCNC: 50 MG/DL
HGB BLD-MCNC: 13.7 G/DL
IMM GRANULOCYTES NFR BLD AUTO: 0.2 %
KETONES URINE: NEGATIVE
LDLC SERPL CALC-MCNC: 90 MG/DL
LEUKOCYTE ESTERASE URINE: NEGATIVE
LYMPHOCYTES # BLD AUTO: 2.82 K/UL
LYMPHOCYTES NFR BLD AUTO: 46.8 %
MAN DIFF?: NORMAL
MCHC RBC-ENTMCNC: 29.4 PG
MCHC RBC-ENTMCNC: 30.8 GM/DL
MCV RBC AUTO: 95.5 FL
MICROALBUMIN 24H UR DL<=1MG/L-MCNC: <1.2 MG/DL
MICROALBUMIN/CREAT 24H UR-RTO: NORMAL MG/G
MONOCYTES # BLD AUTO: 0.24 K/UL
MONOCYTES NFR BLD AUTO: 4 %
NEUTROPHILS # BLD AUTO: 2.62 K/UL
NEUTROPHILS NFR BLD AUTO: 43.4 %
NITRITE URINE: NEGATIVE
NONHDLC SERPL-MCNC: 118 MG/DL
PH URINE: 7.5
PLATELET # BLD AUTO: 246 K/UL
POTASSIUM SERPL-SCNC: 4.4 MMOL/L
PROT SERPL-MCNC: 7.6 G/DL
PROTEIN URINE: NEGATIVE
RBC # BLD: 4.66 M/UL
RBC # FLD: 14.5 %
SODIUM SERPL-SCNC: 138 MMOL/L
SPECIFIC GRAVITY URINE: 1.02
T3 SERPL-MCNC: 104 NG/DL
T3FREE SERPL-MCNC: 2.79 PG/ML
T4 FREE SERPL-MCNC: 1.3 NG/DL
T4 SERPL-MCNC: 8.3 UG/DL
TRIGL SERPL-MCNC: 137 MG/DL
TSH SERPL-ACNC: 2.98 UIU/ML
URATE SERPL-MCNC: 4.7 MG/DL
UROBILINOGEN URINE: NORMAL
WBC # FLD AUTO: 6.03 K/UL

## 2023-02-22 ENCOUNTER — APPOINTMENT (OUTPATIENT)
Dept: CARDIOLOGY | Facility: CLINIC | Age: 68
End: 2023-02-22
Payer: MEDICAID

## 2023-02-22 VITALS
DIASTOLIC BLOOD PRESSURE: 98 MMHG | RESPIRATION RATE: 15 BRPM | SYSTOLIC BLOOD PRESSURE: 140 MMHG | HEIGHT: 64 IN | WEIGHT: 173 LBS | BODY MASS INDEX: 29.53 KG/M2 | OXYGEN SATURATION: 99 % | TEMPERATURE: 97 F | HEART RATE: 77 BPM

## 2023-02-22 PROCEDURE — 93000 ELECTROCARDIOGRAM COMPLETE: CPT

## 2023-02-22 PROCEDURE — 99205 OFFICE O/P NEW HI 60 MIN: CPT | Mod: 25

## 2023-02-22 NOTE — ASSESSMENT
[FreeTextEntry1] : 67F with well controlled HLD- on simvastatin, pre-diabetes, HTN reports dyspnea with exertion \par \par Plan: \par Obtain echocardiogram to evaluate cardiac cause of dyspnea, given change in symptoms. \par No recent TTE

## 2023-02-22 NOTE — REASON FOR VISIT
[Symptom and Test Evaluation] : symptom and test evaluation [FreeTextEntry1] : 67F with well controlled HLD- on simvastatin, pre-diabetes, HTN reports dyspnea with exertion - which as been worsening over the last 6 months. \par Exercise stress test in 2022 was normal\par Normal TTE n 2017 \par ECG: NSR with Q wave in lead III only not meeting criteria for old infarct. \par Currently denies chest pain or pressure, no dyspnea, or palpitation. \par \par \par \par \par

## 2023-02-26 PROBLEM — E03.9 HYPOTHYROIDISM: Status: ACTIVE | Noted: 2022-03-04

## 2023-03-02 ENCOUNTER — APPOINTMENT (OUTPATIENT)
Dept: FAMILY MEDICINE | Facility: CLINIC | Age: 68
End: 2023-03-02
Payer: MEDICAID

## 2023-03-02 DIAGNOSIS — E03.9 HYPOTHYROIDISM, UNSPECIFIED: ICD-10-CM

## 2023-03-04 ENCOUNTER — APPOINTMENT (OUTPATIENT)
Dept: FAMILY MEDICINE | Facility: CLINIC | Age: 68
End: 2023-03-04
Payer: MEDICAID

## 2023-03-04 VITALS — DIASTOLIC BLOOD PRESSURE: 85 MMHG | TEMPERATURE: 97.3 F | SYSTOLIC BLOOD PRESSURE: 133 MMHG

## 2023-03-04 DIAGNOSIS — I10 ESSENTIAL (PRIMARY) HYPERTENSION: ICD-10-CM

## 2023-03-04 DIAGNOSIS — E78.2 MIXED HYPERLIPIDEMIA: ICD-10-CM

## 2023-03-04 DIAGNOSIS — E55.9 VITAMIN D DEFICIENCY, UNSPECIFIED: ICD-10-CM

## 2023-03-04 DIAGNOSIS — E11.9 TYPE 2 DIABETES MELLITUS W/OUT COMPLICATIONS: ICD-10-CM

## 2023-03-04 DIAGNOSIS — M25.562 PAIN IN LEFT KNEE: ICD-10-CM

## 2023-03-04 PROCEDURE — 99214 OFFICE O/P EST MOD 30 MIN: CPT

## 2023-03-04 NOTE — HISTORY OF PRESENT ILLNESS
[de-identified] : Fasting glucose was 108, HBA1c 6.5, estimated glucose 140, HDL 50. Pt was recalled back for abnormal test reports. Reports were reviewed with pt in details. Other blood tests came back wnl. Pt has been taking all her medications.\par Left knee pain got worse. Pt has been getting physical therapy, no help. \par \par \par

## 2023-03-20 ENCOUNTER — APPOINTMENT (OUTPATIENT)
Dept: ORTHOPEDIC SURGERY | Facility: CLINIC | Age: 68
End: 2023-03-20

## 2023-04-24 RX ORDER — ERGOCALCIFEROL 1.25 MG/1
1.25 MG CAPSULE, LIQUID FILLED ORAL
Qty: 5 | Refills: 3 | Status: ACTIVE | COMMUNITY
Start: 1900-01-01 | End: 1900-01-01

## 2023-04-24 RX ORDER — LEVOTHYROXINE SODIUM 0.03 MG/1
25 TABLET ORAL DAILY
Qty: 30 | Refills: 3 | Status: ACTIVE | COMMUNITY
Start: 1900-01-01 | End: 1900-01-01

## 2023-04-24 RX ORDER — FLUTICASONE PROPIONATE 50 UG/1
50 SPRAY, METERED NASAL TWICE DAILY
Qty: 1 | Refills: 3 | Status: ACTIVE | COMMUNITY
Start: 1900-01-01 | End: 1900-01-01

## 2023-08-13 NOTE — ED PROVIDER NOTE - PHYSICAL EXAMINATION
EMS Well appearing, well nourished, awake, alert, oriented to person, place, time/situation and in pain distress.    Airway patent    Eyes without scleral injection. No jaundice.    Strong pulse.    Respirations unlabored.    Abdomen soft, non-tender, no guarding.    Spine appears normal, range of motion is not limited, no muscle or joint tenderness.    Alert and oriented, no gross motor or sensory deficits.    Skin normal color for race, warm, dry and intact. No evidence of rash.    No SI/HI. Ambulance Well appearing, well nourished, awake, alert, oriented to person, place, time/situation and in pain distress.    Airway patent    Eyes without scleral injection. No jaundice.    Strong pulse.    Respirations unlabored.    Abdomen soft, non-tender, no guarding.    Spine appears normal, range of motion is not limited, no muscle or joint tenderness.    Alert and oriented, no gross motor or sensory deficits.    Skin normal color for race, warm, dry and intact. No evidence of rash.    No SI/HI.    Guido, PGY2        GENERAL: well appearing; in pain distress  HEAD: normocephalic, atraumatic  HENT: airway intact, neck supple  EYES: normal conjunctiva  CARDIAC: regular rate and rhythm, normal S1S2, no appreciable murmurs, 2+ pulses in UE/LE b/l  PULM: normal breath sounds, clear to ascultation bilaterally, no rales, rhonchi, wheezing  GI: abdomen nondistended, soft, nontender, no guarding, rebound tenderness  : right flank pain  NEURO: no focal motor or sensory deficits  MSK: b/l pedal edema, no calf tenderness b/l  SKIN: well-perfused, extremities warm, no visible rashes  PSYCH: appropriate mood and affect

## 2024-08-07 NOTE — HISTORY OF PRESENT ILLNESS
[de-identified] : 68 years old female has past medical history of allergic rhinitis, constipation, diabetes, hypertension, hyperlipidemia, hypothyroidism, and vitamin D deficiency, came back for annual physical exam.

## 2024-08-14 ENCOUNTER — APPOINTMENT (OUTPATIENT)
Dept: FAMILY MEDICINE | Facility: CLINIC | Age: 69
End: 2024-08-14

## 2024-08-14 DIAGNOSIS — Z12.39 ENCOUNTER FOR OTHER SCREENING FOR MALIGNANT NEOPLASM OF BREAST: ICD-10-CM

## 2024-08-14 DIAGNOSIS — E55.9 VITAMIN D DEFICIENCY, UNSPECIFIED: ICD-10-CM

## 2024-08-14 DIAGNOSIS — E78.2 MIXED HYPERLIPIDEMIA: ICD-10-CM

## 2024-08-14 DIAGNOSIS — E11.9 TYPE 2 DIABETES MELLITUS W/OUT COMPLICATIONS: ICD-10-CM

## 2024-08-14 DIAGNOSIS — I10 ESSENTIAL (PRIMARY) HYPERTENSION: ICD-10-CM

## 2024-08-14 DIAGNOSIS — K59.00 CONSTIPATION, UNSPECIFIED: ICD-10-CM

## 2024-08-14 DIAGNOSIS — E03.9 HYPOTHYROIDISM, UNSPECIFIED: ICD-10-CM

## 2024-08-14 DIAGNOSIS — Z00.01 ENCOUNTER FOR GENERAL ADULT MEDICAL EXAMINATION WITH ABNORMAL FINDINGS: ICD-10-CM

## 2024-08-14 DIAGNOSIS — Z12.11 ENCOUNTER FOR SCREENING FOR MALIGNANT NEOPLASM OF COLON: ICD-10-CM

## 2024-08-14 DIAGNOSIS — J30.9 ALLERGIC RHINITIS, UNSPECIFIED: ICD-10-CM

## 2024-09-04 ENCOUNTER — LABORATORY RESULT (OUTPATIENT)
Age: 69
End: 2024-09-04

## 2024-09-04 ENCOUNTER — APPOINTMENT (OUTPATIENT)
Dept: FAMILY MEDICINE | Facility: CLINIC | Age: 69
End: 2024-09-04
Payer: MEDICARE

## 2024-09-04 ENCOUNTER — NON-APPOINTMENT (OUTPATIENT)
Age: 69
End: 2024-09-04

## 2024-09-04 VITALS
TEMPERATURE: 97.8 F | WEIGHT: 177 LBS | SYSTOLIC BLOOD PRESSURE: 135 MMHG | HEART RATE: 60 BPM | DIASTOLIC BLOOD PRESSURE: 82 MMHG | BODY MASS INDEX: 30.22 KG/M2 | OXYGEN SATURATION: 96 % | HEIGHT: 64 IN

## 2024-09-04 DIAGNOSIS — Z12.11 ENCOUNTER FOR SCREENING FOR MALIGNANT NEOPLASM OF COLON: ICD-10-CM

## 2024-09-04 DIAGNOSIS — Z00.01 ENCOUNTER FOR GENERAL ADULT MEDICAL EXAMINATION WITH ABNORMAL FINDINGS: ICD-10-CM

## 2024-09-04 DIAGNOSIS — Z12.39 ENCOUNTER FOR OTHER SCREENING FOR MALIGNANT NEOPLASM OF BREAST: ICD-10-CM

## 2024-09-04 DIAGNOSIS — J30.9 ALLERGIC RHINITIS, UNSPECIFIED: ICD-10-CM

## 2024-09-04 DIAGNOSIS — E04.9 NONTOXIC GOITER, UNSPECIFIED: ICD-10-CM

## 2024-09-04 DIAGNOSIS — H60.90 UNSPECIFIED OTITIS EXTERNA, UNSPECIFIED EAR: ICD-10-CM

## 2024-09-04 DIAGNOSIS — I10 ESSENTIAL (PRIMARY) HYPERTENSION: ICD-10-CM

## 2024-09-04 DIAGNOSIS — R94.31 ABNORMAL ELECTROCARDIOGRAM [ECG] [EKG]: ICD-10-CM

## 2024-09-04 DIAGNOSIS — Z13.31 ENCOUNTER FOR SCREENING FOR DEPRESSION: ICD-10-CM

## 2024-09-04 DIAGNOSIS — K59.00 CONSTIPATION, UNSPECIFIED: ICD-10-CM

## 2024-09-04 PROCEDURE — G0444 DEPRESSION SCREEN ANNUAL: CPT

## 2024-09-04 PROCEDURE — G0439: CPT

## 2024-09-04 PROCEDURE — 93000 ELECTROCARDIOGRAM COMPLETE: CPT | Mod: 59

## 2024-09-04 NOTE — HEALTH RISK ASSESSMENT
[Good] : ~his/her~  mood as  good [No] : In the past 12 months have you used drugs other than those required for medical reasons? No [No falls in past year] : Patient reported no falls in the past year [Several Days (1)] : 3.) Trouble falling asleep, or sleeping too much? Several days [1/2 of Days or More (2)] : 4.) Feeling tired or having little energy? Half the days or more [Not at All (0)] : 9.) Thoughts that you would be off dead or of hurting yourself in some way? Not at all [Never] : Never [Patient reported mammogram was normal] : Patient reported mammogram was normal [Patient reported colonoscopy was normal] : Patient reported colonoscopy was normal [With Family] : lives with family [Retired] : retired [] :  [Fully functional (bathing, dressing, toileting, transferring, walking, feeding)] : Fully functional (bathing, dressing, toileting, transferring, walking, feeding) [Fully functional (using the telephone, shopping, preparing meals, housekeeping, doing laundry, using] : Fully functional and needs no help or supervision to perform IADLs (using the telephone, shopping, preparing meals, housekeeping, doing laundry, using transportation, managing medications and managing finances) [Designated Healthcare Proxy] : Designated healthcare proxy [Relationship: ___] : Relationship: [unfilled] [PHQ-9 Positive] : PHQ-9 Positive [I have developed a follow-up plan documented below in the note.] : I have developed a follow-up plan documented below in the note. [[id number]] : 3 [MammogramDate] : 11/22 [ColonoscopyDate] : 5/22 [AdvancecareDate] : 9/4/24

## 2024-09-04 NOTE — HISTORY OF PRESENT ILLNESS
[de-identified] : 68 years old female has past medical history of allergic rhinitis, constipation, diabetes, hypertension, hyperlipidemia, hypothyroidism, and vitamin D deficiency, came back for annual physical exam.  Pt complained both ears pain and itching. Pt used Q tips to scratch her ears a lot.

## 2024-09-04 NOTE — HEALTH RISK ASSESSMENT
[Good] : ~his/her~  mood as  good [No] : In the past 12 months have you used drugs other than those required for medical reasons? No [No falls in past year] : Patient reported no falls in the past year [Several Days (1)] : 3.) Trouble falling asleep, or sleeping too much? Several days [1/2 of Days or More (2)] : 4.) Feeling tired or having little energy? Half the days or more [Not at All (0)] : 9.) Thoughts that you would be off dead or of hurting yourself in some way? Not at all [Never] : Never [Patient reported mammogram was normal] : Patient reported mammogram was normal [Patient reported colonoscopy was normal] : Patient reported colonoscopy was normal [With Family] : lives with family [Retired] : retired [] :  [Fully functional (bathing, dressing, toileting, transferring, walking, feeding)] : Fully functional (bathing, dressing, toileting, transferring, walking, feeding) [Fully functional (using the telephone, shopping, preparing meals, housekeeping, doing laundry, using] : Fully functional and needs no help or supervision to perform IADLs (using the telephone, shopping, preparing meals, housekeeping, doing laundry, using transportation, managing medications and managing finances) [Designated Healthcare Proxy] : Designated healthcare proxy [Relationship: ___] : Relationship: [unfilled] [PHQ-9 Positive] : PHQ-9 Positive [I have developed a follow-up plan documented below in the note.] : I have developed a follow-up plan documented below in the note. [[id number]] : 3 [MammogramDate] : 11/22 [ColonoscopyDate] : 5/22 [AdvancecareDate] : 9/4/24

## 2024-09-04 NOTE — PHYSICAL EXAM
[Normal TMs] : both tympanic membranes were normal [Normal Nasal Mucosa] : the nasal mucosa was normal [de-identified] : External canal: erythematous and mildly swelling [de-identified] : Questionable right thyroid lobe enlargement, left lobe wnl.

## 2024-09-04 NOTE — HISTORY OF PRESENT ILLNESS
[de-identified] : 68 years old female has past medical history of allergic rhinitis, constipation, diabetes, hypertension, hyperlipidemia, hypothyroidism, and vitamin D deficiency, came back for annual physical exam.  Pt complained both ears pain and itching. Pt used Q tips to scratch her ears a lot.

## 2024-09-04 NOTE — PHYSICAL EXAM
[Normal TMs] : both tympanic membranes were normal [Normal Nasal Mucosa] : the nasal mucosa was normal [de-identified] : External canal: erythematous and mildly swelling [de-identified] : Questionable right thyroid lobe enlargement, left lobe wnl.

## 2024-09-08 NOTE — HISTORY OF PRESENT ILLNESS
[de-identified] : 68 years old female has past medical history of allergic rhinitis, constipation, diabetes, hypertension, hyperlipidemia, hypothyroidism, and vitamin D deficiency, came back to review her most recent test results. Fasting glucose was 125, HBA1c 6.4, , HDL 46, . Reports were reviewed with pt in details. Other blood tests came back wnl.

## 2024-09-13 ENCOUNTER — APPOINTMENT (OUTPATIENT)
Dept: FAMILY MEDICINE | Facility: CLINIC | Age: 69
End: 2024-09-13
Payer: MEDICARE

## 2024-09-13 VITALS — SYSTOLIC BLOOD PRESSURE: 118 MMHG | TEMPERATURE: 97.1 F | DIASTOLIC BLOOD PRESSURE: 74 MMHG

## 2024-09-13 DIAGNOSIS — E11.9 TYPE 2 DIABETES MELLITUS W/OUT COMPLICATIONS: ICD-10-CM

## 2024-09-13 DIAGNOSIS — E03.9 HYPOTHYROIDISM, UNSPECIFIED: ICD-10-CM

## 2024-09-13 DIAGNOSIS — E55.9 VITAMIN D DEFICIENCY, UNSPECIFIED: ICD-10-CM

## 2024-09-13 DIAGNOSIS — Z23 ENCOUNTER FOR IMMUNIZATION: ICD-10-CM

## 2024-09-13 PROCEDURE — G2211 COMPLEX E/M VISIT ADD ON: CPT | Mod: NC

## 2024-09-13 PROCEDURE — 99214 OFFICE O/P EST MOD 30 MIN: CPT | Mod: 25

## 2024-09-13 PROCEDURE — 90686 IIV4 VACC NO PRSV 0.5 ML IM: CPT

## 2024-09-13 PROCEDURE — G0008: CPT

## 2024-09-13 RX ORDER — CHROMIUM 200 MCG
25 MCG TABLET ORAL DAILY
Qty: 90 | Refills: 1 | Status: ACTIVE | COMMUNITY
Start: 2024-09-13

## 2024-09-13 RX ORDER — CIPROFLOXACIN AND DEXAMETHASONE 3; 1 MG/ML; MG/ML
0.3-0.1 SUSPENSION/ DROPS AURICULAR (OTIC) TWICE DAILY
Qty: 1 | Refills: 1 | Status: DISCONTINUED | COMMUNITY
Start: 2024-09-04 | End: 2024-09-13

## 2024-09-13 NOTE — HISTORY OF PRESENT ILLNESS
[de-identified] : 68 years old female has past medical history of allergic rhinitis, constipation, diabetes, hypertension, hyperlipidemia, hypothyroidism, and vitamin D deficiency, came back to review her most recent test results. Fasting glucose was 125, HBA1c 6.4, , HDL 46, . Reports were reviewed with pt in details. Other blood tests came back wnl.

## 2024-09-16 ENCOUNTER — APPOINTMENT (OUTPATIENT)
Dept: CARDIOLOGY | Facility: CLINIC | Age: 69
End: 2024-09-16
Payer: MEDICARE

## 2024-09-16 VITALS
BODY MASS INDEX: 30.9 KG/M2 | HEIGHT: 64 IN | HEART RATE: 77 BPM | RESPIRATION RATE: 15 BRPM | WEIGHT: 181 LBS | SYSTOLIC BLOOD PRESSURE: 136 MMHG | DIASTOLIC BLOOD PRESSURE: 79 MMHG | OXYGEN SATURATION: 98 %

## 2024-09-16 DIAGNOSIS — R06.02 SHORTNESS OF BREATH: ICD-10-CM

## 2024-09-16 DIAGNOSIS — E78.2 MIXED HYPERLIPIDEMIA: ICD-10-CM

## 2024-09-16 PROCEDURE — 93306 TTE W/DOPPLER COMPLETE: CPT

## 2024-09-16 PROCEDURE — 99203 OFFICE O/P NEW LOW 30 MIN: CPT | Mod: 25

## 2024-09-16 NOTE — DISCUSSION/SUMMARY
[FreeTextEntry1] : In a summary Maida Greer is a middle- aged- female with Hyperlipidemia, continue Simvastatin. Shortness of breath on exertion, Echo done showed normal LV systolic function and wall motion. Stress test to rule out ischemia. Hypothyroidism, on Levothyroxine. Further plan based on stress test results.

## 2024-09-16 NOTE — HISTORY OF PRESENT ILLNESS
[FreeTextEntry1] : Maida Greer is a 68- year- old female with Hyperlipidemia and Hypothyroidism comes for cardiac evaluation. Denies any exertional chest pains or palpitations. Mild shortness of breath on exertion which is relieved with rest in few minutes. Compliant to medications and diet.

## 2024-09-16 NOTE — REVIEW OF SYSTEMS
[Dyspnea on exertion] : dyspnea during exertion [Negative] : Respiratory [Blurry Vision] : no blurred vision [Chest Discomfort] : no chest discomfort [Lower Ext Edema] : no extremity edema [Palpitations] : no palpitations [Orthopnea] : no orthopnea [PND] : no PND [Abdominal Pain] : no abdominal pain [Nausea] : no nausea [Vomiting] : no vomiting [Heartburn] : no heartburn [Rash] : no rash [Itching] : no itching [Dizziness] : no dizziness [Tremor] : no tremor was seen [Numbness (Hypoesthesia)] : no numbness [Tingling (Paresthesia)] : no tingling [Confusion] : no confusion was observed [Anxiety] : no anxiety [Under Stress] : not under stress [Easy Bleeding] : no tendency for easy bleeding [Easy Bruising] : no tendency for easy bruising

## 2024-11-26 PROBLEM — M85.80 OSTEOPENIA, UNSPECIFIED LOCATION: Status: ACTIVE | Noted: 2024-11-26

## 2024-11-26 PROBLEM — E04.1 THYROID NODULE: Status: ACTIVE | Noted: 2024-11-26

## 2024-12-03 ENCOUNTER — APPOINTMENT (OUTPATIENT)
Dept: FAMILY MEDICINE | Facility: CLINIC | Age: 69
End: 2024-12-03

## 2024-12-03 DIAGNOSIS — M85.80 OTHER SPECIFIED DISORDERS OF BONE DENSITY AND STRUCTURE, UNSPECIFIED SITE: ICD-10-CM

## 2024-12-03 DIAGNOSIS — E04.1 NONTOXIC SINGLE THYROID NODULE: ICD-10-CM

## 2025-02-14 ENCOUNTER — APPOINTMENT (OUTPATIENT)
Dept: FAMILY MEDICINE | Facility: CLINIC | Age: 70
End: 2025-02-14
Payer: MEDICARE

## 2025-02-14 ENCOUNTER — LABORATORY RESULT (OUTPATIENT)
Age: 70
End: 2025-02-14

## 2025-02-14 VITALS
DIASTOLIC BLOOD PRESSURE: 74 MMHG | BODY MASS INDEX: 34.95 KG/M2 | RESPIRATION RATE: 15 BRPM | WEIGHT: 178 LBS | HEIGHT: 60 IN | SYSTOLIC BLOOD PRESSURE: 178 MMHG | HEART RATE: 81 BPM | OXYGEN SATURATION: 96 % | TEMPERATURE: 97.5 F

## 2025-02-14 DIAGNOSIS — I10 ESSENTIAL (PRIMARY) HYPERTENSION: ICD-10-CM

## 2025-02-14 DIAGNOSIS — R21 RASH AND OTHER NONSPECIFIC SKIN ERUPTION: ICD-10-CM

## 2025-02-14 DIAGNOSIS — E04.1 NONTOXIC SINGLE THYROID NODULE: ICD-10-CM

## 2025-02-14 DIAGNOSIS — M85.80 OTHER SPECIFIED DISORDERS OF BONE DENSITY AND STRUCTURE, UNSPECIFIED SITE: ICD-10-CM

## 2025-02-14 PROCEDURE — 99214 OFFICE O/P EST MOD 30 MIN: CPT | Mod: 25

## 2025-02-14 RX ORDER — CLOTRIMAZOLE 10 MG/G
1 CREAM TOPICAL TWICE DAILY
Qty: 1 | Refills: 1 | Status: ACTIVE | COMMUNITY
Start: 2025-02-14 | End: 1900-01-01

## 2025-02-14 RX ORDER — CALCIUM CITRATE/VITAMIN D3 200MG-6.25
500-10 TABLET ORAL
Qty: 90 | Refills: 1 | Status: ACTIVE | COMMUNITY
Start: 2025-02-14 | End: 1900-01-01

## 2025-02-21 ENCOUNTER — APPOINTMENT (OUTPATIENT)
Dept: FAMILY MEDICINE | Facility: CLINIC | Age: 70
End: 2025-02-21
Payer: MEDICARE

## 2025-02-21 DIAGNOSIS — N39.0 URINARY TRACT INFECTION, SITE NOT SPECIFIED: ICD-10-CM

## 2025-02-21 DIAGNOSIS — E78.2 MIXED HYPERLIPIDEMIA: ICD-10-CM

## 2025-02-21 DIAGNOSIS — E11.9 TYPE 2 DIABETES MELLITUS W/OUT COMPLICATIONS: ICD-10-CM

## 2025-02-21 DIAGNOSIS — E55.9 VITAMIN D DEFICIENCY, UNSPECIFIED: ICD-10-CM

## 2025-02-21 DIAGNOSIS — J06.9 ACUTE UPPER RESPIRATORY INFECTION, UNSPECIFIED: ICD-10-CM

## 2025-02-21 DIAGNOSIS — E03.9 HYPOTHYROIDISM, UNSPECIFIED: ICD-10-CM

## 2025-02-21 PROCEDURE — 99214 OFFICE O/P EST MOD 30 MIN: CPT

## 2025-02-21 PROCEDURE — G2211 COMPLEX E/M VISIT ADD ON: CPT

## 2025-02-21 RX ORDER — BENZONATATE 100 MG/1
100 CAPSULE ORAL 3 TIMES DAILY
Qty: 30 | Refills: 1 | Status: ACTIVE | COMMUNITY
Start: 2025-02-21 | End: 1900-01-01

## 2025-02-25 LAB — BACTERIA UR CULT: ABNORMAL

## 2025-03-05 ENCOUNTER — APPOINTMENT (OUTPATIENT)
Dept: FAMILY MEDICINE | Facility: CLINIC | Age: 70
End: 2025-03-05
Payer: MEDICARE

## 2025-03-05 VITALS
WEIGHT: 179 LBS | DIASTOLIC BLOOD PRESSURE: 84 MMHG | SYSTOLIC BLOOD PRESSURE: 135 MMHG | HEIGHT: 64 IN | TEMPERATURE: 98.7 F | OXYGEN SATURATION: 95 % | HEART RATE: 75 BPM | BODY MASS INDEX: 30.56 KG/M2

## 2025-03-05 PROBLEM — C73 THYROID CANCER: Status: ACTIVE | Noted: 2025-03-04

## 2025-03-05 PROCEDURE — G2211 COMPLEX E/M VISIT ADD ON: CPT

## 2025-03-05 PROCEDURE — 99213 OFFICE O/P EST LOW 20 MIN: CPT

## 2025-03-06 ENCOUNTER — APPOINTMENT (OUTPATIENT)
Dept: OTOLARYNGOLOGY | Facility: CLINIC | Age: 70
End: 2025-03-06

## 2025-03-11 ENCOUNTER — APPOINTMENT (OUTPATIENT)
Dept: OTOLARYNGOLOGY | Facility: CLINIC | Age: 70
End: 2025-03-11
Payer: MEDICARE

## 2025-03-11 DIAGNOSIS — C73 MALIGNANT NEOPLASM OF THYROID GLAND: ICD-10-CM

## 2025-03-11 PROCEDURE — 99204 OFFICE O/P NEW MOD 45 MIN: CPT

## 2025-03-12 LAB
THYROGLOB AB SERPL-ACNC: <15 IU/ML
THYROPEROXIDASE AB SERPL IA-ACNC: 16.1 IU/ML

## 2025-03-17 ENCOUNTER — APPOINTMENT (OUTPATIENT)
Dept: CARDIOLOGY | Facility: CLINIC | Age: 70
End: 2025-03-17

## 2025-04-22 ENCOUNTER — NON-APPOINTMENT (OUTPATIENT)
Age: 70
End: 2025-04-22

## 2025-04-22 ENCOUNTER — APPOINTMENT (OUTPATIENT)
Dept: FAMILY MEDICINE | Facility: CLINIC | Age: 70
End: 2025-04-22
Payer: MEDICARE

## 2025-04-22 VITALS
WEIGHT: 181 LBS | BODY MASS INDEX: 30.9 KG/M2 | DIASTOLIC BLOOD PRESSURE: 82 MMHG | SYSTOLIC BLOOD PRESSURE: 133 MMHG | HEIGHT: 64 IN | OXYGEN SATURATION: 95 % | HEART RATE: 67 BPM | TEMPERATURE: 98.2 F

## 2025-04-22 DIAGNOSIS — K21.9 GASTRO-ESOPHAGEAL REFLUX DISEASE W/OUT ESOPHAGITIS: ICD-10-CM

## 2025-04-22 DIAGNOSIS — J30.9 ALLERGIC RHINITIS, UNSPECIFIED: ICD-10-CM

## 2025-04-22 DIAGNOSIS — E11.9 TYPE 2 DIABETES MELLITUS W/OUT COMPLICATIONS: ICD-10-CM

## 2025-04-22 PROCEDURE — 99214 OFFICE O/P EST MOD 30 MIN: CPT | Mod: 25

## 2025-04-23 PROBLEM — Z01.818 PREOP EXAMINATION: Status: ACTIVE | Noted: 2025-04-22

## 2025-04-23 LAB
25(OH)D3 SERPL-MCNC: 35.7 NG/ML
ALBUMIN SERPL ELPH-MCNC: 4.3 G/DL
ALP BLD-CCNC: 88 U/L
ALT SERPL-CCNC: 37 U/L
ANION GAP SERPL CALC-SCNC: 13 MMOL/L
APPEARANCE: CLEAR
AST SERPL-CCNC: 27 U/L
BASOPHILS # BLD AUTO: 0.05 K/UL
BASOPHILS NFR BLD AUTO: 0.9 %
BILIRUB SERPL-MCNC: 0.4 MG/DL
BILIRUBIN URINE: NEGATIVE
BLOOD URINE: NEGATIVE
BUN SERPL-MCNC: 16 MG/DL
CALCIUM SERPL-MCNC: 8.9 MG/DL
CHLORIDE SERPL-SCNC: 105 MMOL/L
CHOLEST SERPL-MCNC: 169 MG/DL
CO2 SERPL-SCNC: 22 MMOL/L
COLOR: YELLOW
CREAT SERPL-MCNC: 0.7 MG/DL
CREAT SPEC-SCNC: 119 MG/DL
EGFRCR SERPLBLD CKD-EPI 2021: 95 ML/MIN/1.73M2
EOSINOPHIL # BLD AUTO: 0.4 K/UL
EOSINOPHIL NFR BLD AUTO: 7.1 %
ESTIMATED AVERAGE GLUCOSE: 154 MG/DL
GLUCOSE QUALITATIVE U: NEGATIVE MG/DL
GLUCOSE SERPL-MCNC: 123 MG/DL
HBA1C MFR BLD HPLC: 7 %
HCT VFR BLD CALC: 41 %
HDLC SERPL-MCNC: 51 MG/DL
HGB BLD-MCNC: 13.2 G/DL
IMM GRANULOCYTES NFR BLD AUTO: 0.2 %
KETONES URINE: NEGATIVE MG/DL
LDLC SERPL-MCNC: 95 MG/DL
LEUKOCYTE ESTERASE URINE: NEGATIVE
LYMPHOCYTES # BLD AUTO: 2.23 K/UL
LYMPHOCYTES NFR BLD AUTO: 39.3 %
MAN DIFF?: NORMAL
MCHC RBC-ENTMCNC: 29.5 PG
MCHC RBC-ENTMCNC: 32.2 G/DL
MCV RBC AUTO: 91.5 FL
MICROALBUMIN 24H UR DL<=1MG/L-MCNC: <1.2 MG/DL
MICROALBUMIN/CREAT 24H UR-RTO: NORMAL MG/G
MONOCYTES # BLD AUTO: 0.25 K/UL
MONOCYTES NFR BLD AUTO: 4.4 %
NEUTROPHILS # BLD AUTO: 2.73 K/UL
NEUTROPHILS NFR BLD AUTO: 48.1 %
NITRITE URINE: NEGATIVE
NONHDLC SERPL-MCNC: 118 MG/DL
PH URINE: 6.5
PLATELET # BLD AUTO: 217 K/UL
POTASSIUM SERPL-SCNC: 4.1 MMOL/L
PROT SERPL-MCNC: 6.9 G/DL
PROTEIN URINE: NEGATIVE MG/DL
RBC # BLD: 4.48 M/UL
RBC # FLD: 14.5 %
SODIUM SERPL-SCNC: 140 MMOL/L
SPECIFIC GRAVITY URINE: 1.02
T3 SERPL-MCNC: 93 NG/DL
T3FREE SERPL-MCNC: 2.96 PG/ML
T4 FREE SERPL-MCNC: 1.3 NG/DL
T4 SERPL-MCNC: 7.6 UG/DL
TRIGL SERPL-MCNC: 130 MG/DL
TSH SERPL-ACNC: 2.77 UIU/ML
URATE SERPL-MCNC: 4.8 MG/DL
UROBILINOGEN URINE: 0.2 MG/DL
WBC # FLD AUTO: 5.67 K/UL

## 2025-04-24 ENCOUNTER — APPOINTMENT (OUTPATIENT)
Dept: FAMILY MEDICINE | Facility: CLINIC | Age: 70
End: 2025-04-24
Payer: MEDICARE

## 2025-04-24 DIAGNOSIS — E55.9 VITAMIN D DEFICIENCY, UNSPECIFIED: ICD-10-CM

## 2025-04-24 DIAGNOSIS — E11.65 TYPE 2 DIABETES MELLITUS WITH HYPERGLYCEMIA: ICD-10-CM

## 2025-04-24 DIAGNOSIS — Z01.818 ENCOUNTER FOR OTHER PREPROCEDURAL EXAMINATION: ICD-10-CM

## 2025-04-24 DIAGNOSIS — E78.2 MIXED HYPERLIPIDEMIA: ICD-10-CM

## 2025-04-24 DIAGNOSIS — I10 ESSENTIAL (PRIMARY) HYPERTENSION: ICD-10-CM

## 2025-04-24 DIAGNOSIS — E03.9 HYPOTHYROIDISM, UNSPECIFIED: ICD-10-CM

## 2025-04-24 PROCEDURE — 99214 OFFICE O/P EST MOD 30 MIN: CPT

## 2025-04-24 PROCEDURE — G2211 COMPLEX E/M VISIT ADD ON: CPT

## 2025-04-24 RX ORDER — METFORMIN HYDROCHLORIDE 500 MG/1
500 TABLET, COATED ORAL
Qty: 180 | Refills: 1 | Status: ACTIVE | COMMUNITY
Start: 2025-04-24 | End: 1900-01-01

## 2025-04-25 ENCOUNTER — OUTPATIENT (OUTPATIENT)
Dept: OUTPATIENT SERVICES | Facility: HOSPITAL | Age: 70
LOS: 1 days | End: 2025-04-25

## 2025-04-25 VITALS
HEIGHT: 63 IN | TEMPERATURE: 98 F | WEIGHT: 179.9 LBS | HEART RATE: 55 BPM | OXYGEN SATURATION: 98 % | DIASTOLIC BLOOD PRESSURE: 77 MMHG | RESPIRATION RATE: 16 BRPM | SYSTOLIC BLOOD PRESSURE: 123 MMHG

## 2025-04-25 DIAGNOSIS — Z90.710 ACQUIRED ABSENCE OF BOTH CERVIX AND UTERUS: Chronic | ICD-10-CM

## 2025-04-25 DIAGNOSIS — E03.9 HYPOTHYROIDISM, UNSPECIFIED: ICD-10-CM

## 2025-04-25 DIAGNOSIS — E11.9 TYPE 2 DIABETES MELLITUS WITHOUT COMPLICATIONS: ICD-10-CM

## 2025-04-25 DIAGNOSIS — C73 MALIGNANT NEOPLASM OF THYROID GLAND: ICD-10-CM

## 2025-04-25 NOTE — H&P PST ADULT - HISTORY OF PRESENT ILLNESS
69 yr old female with HTN, HLD, T2DM, hypothyroidism, and GERD  presents to PST for preop evaluation. Pt reports history of thyroid nodule for one year. FNA revealed right upper pole 1.8cm nodule PTCA. Pt denies any symptoms. Patient is scheduled for total thyroidectomy with nerve monitoring on 5/5/2025.

## 2025-04-25 NOTE — H&P PST ADULT - OPHTHALMOLOGIC
Patient : Nelly Lee Age: 31year old Sex: female   MRN: [id number] Encounter Date: 3/27/2024      History     Chief Complaint   Patient presents with    Dental Pain     HPI  36F who presents with dental pain. Patient reports that she has been experiencing issues with one of her right upper molars for years. She states that it began to act up again about 2 weeks ago. She has an appointment with a dentist on Friday but states that the pain has been unbearable. She has been using naproxen, Tylenol, Orajel, as well as ketorolac from an old prescription. She states that she feels like the ketorolac was the most helpful but she ran out of medications. She has not had any fevers. No difficulty breathing. No Known Allergies    Discharge Medication List as of 3/27/2024  7:51 PM        Prior to Admission Medications    Details   tamsulosin (FLOMAX) 0.4 MG Cap Take 1 capsule by mouth daily after a meal.Eprescribe, Disp-30 capsule, R-0      omeprazole (PriLOSEC) 40 MG capsule Take 1 capsule by mouth daily. Eprescribe, Disp-30 capsule, R-0      HYDROcodone-acetaminophen (NORCO) 5-325 MG per tablet Indication: Acute PainTake 1 tablet by mouth every 6 hours as needed for Pain. Eprescribe, Disp-7 tablet, R-0      ondansetron (ZOFRAN ODT) 4 MG disintegrating tablet Place 1 tablet onto the tongue every 8 hours as needed for Nausea. Eprescribe, Disp-10 tablet, R-0      minocycline (MINOCIN) 50 MG capsule Take 50 mg by mouth in the morning and 50 mg in the evening. Historical Med      cyclobenzaprine (FLEXERIL) 10 MG tablet Take 0.5 tablets by mouth 2 times daily as needed for Muscle spasms. Eprescribe, Oral, 2 TIMES DAILY PRN, Disp-10 tablet, R-0Consider prescribing in whole tablet strengths, as the tablets can be difficult to break (depends on product carried by the [facility] pharmacy). minocycline (MINOCIN) 100 MG capsule Take 1 capsule by mouth nightly.  For rosaceaHistorical Med      triamcinolone (ARISTOCORT) 0.1 % cream Apply a thin coat to rash on chest area PRN for flare-upHistorical Med      levothyroxine 125 MCG tablet Take 1 tablet orally once daily on an empty stomach 1 hour before breakfast.Eprescribe, Disp-90 tablet, R-3           New Prescriptions    Details   penicillin v potassium (VEETID) 500 MG tablet Take 1 tablet by mouth 4 times daily for 7 days. Eprescribe, Disp-28 tablet, R-0           Modified Medications    Details   ketorolac (TORADOL) 10 MG tablet Take 1 tablet by mouth every 6 hours as needed for Pain. Eprescribe, Disp-30 tablet, R-0             Past Medical History:   Diagnosis Date    Hx of abnormal cervical Pap smear     unsure how long ago, others normal    Hx of chlamydia infection 2015    Hypothyroidism (acquired)     Kidney stones     Rosacea     on an antibiotic       Past Surgical History:   Procedure Laterality Date    COLPOSCOPY,LOOP ELECTRD CERVIX EXCIS  04/16/2021    MARKELL II    TUBAL LIGATION  04/16/2021    salpingectomies    WISDOM TOOTH EXTRACTION         Family History   Problem Relation Age of Onset    Seizure Disorder Mother     Other Mother         brain tumor, shunt in her head    Alcohol Abuse Father     Patient is unaware of any medical problems Maternal Grandmother     Substance Abuse Maternal Grandfather         tobacco    Other Paternal Grandmother         Hypotension    Anemia Paternal Grandmother     Alcohol Abuse Paternal Grandfather     Cirrhosis Paternal Grandfather     Cancer Daughter         astrocytoma of the spinal cord - surgery and chemo    Other Daughter         Neurogenic bladder and bowel       Social History     Tobacco Use    Smoking status: Former     Current packs/day: 0.10     Average packs/day: 0.1 packs/day for 0.5 years (0.1 ttl pk-yrs)     Types: Cigarettes    Smokeless tobacco: Never    Tobacco comments:     social   Vaping Use    Vaping Use: never used   Substance Use Topics    Alcohol use: Yes     Comment: socially    Drug use: Not Currently "Frequency: 6.0 times per week     Types: Marijuana       E-cigarette/Vaping    E-Cigarette/Vaping Use Never Used      E-Cigarette/Vaping Substances & Devices       Review of Systems   Constitutional:  Negative for fever. HENT:  Positive for dental problem. Negative for drooling and facial swelling. Respiratory:  Negative for shortness of breath. Skin:  Negative for wound. Physical Exam     ED Triage Vitals [03/27/24 1921]   ED Triage Vitals Group      Temp 98.2 Â°F (36.8 Â°C)      Heart Rate 80      Resp 18      /83      SpO2 97 %      EtCO2 mmHg       Height 5' 6"" (1.676 m)      Weight 240 lb 12.8 oz (109.2 kg)      Weight Scale Used Standing scale      BMI (Calculated) 38.87      IBW/kg (Calculated) 59.3       Physical Exam  General: Non-toxic appearing. Alert and oriented x3. HEENT: Atraumatic, normocephalic. Normal voice, handling secretions normally. No facial swelling or erythema. Discoloration of tooth #1 w/o visualized drainable abscess. No submandibular tenderness or inudration. CV: Normal rate, regular rhythm. Resp: Breathing comfortably on room air  MSK: No obvious deformities  Neuro: Moving all extremities spontaneously  Psychiatric: Calm and cooperative  Skin: No jaundice       ED Course     Procedures    Lab Results     No results found for this visit on 03/27/24. Radiology Results     Imaging Results    None         ED Medication Orders (From admission, onward)      Ordered Start     Status Ordering Provider    03/27/24 1948 03/27/24 1949  penicillin V potassium (VEETIDS) tablet 500 mg  ONCE         Last MAR action: Given Tony Olp    03/27/24 1948 03/27/24 1949  oxyCODONE (IMM REL) (ROXICODONE) tablet 5 mg  ONCE         Last MAR action: Given JIMMY, [address] [facility] Decision Making  36F who presents with dental pain. On exam she has discoloration of tooth #1 without obvious adjacent drainable abscess.   No evidence of Salo's angina or facial cellulitis " at this time. She has no evidence of respiratory distress and his protecting her airway. Given possibility for dental infection will prescribe penicillin, first dose given in the ED. she states that her symptoms were best controlled with Toradol, will represcribed. Also recommended to use Tylenol for pain. Stressed the importance of following up with her dentist at her appointment on Friday for definitive care. Return precautions to the ED discussed including severe worsening pain, difficulty breathing, difficulty handling secretions, or other concerns. Patient expressed understanding and agreement with plan. Clinical Impression     ED Diagnosis   1. Pain, dental            Disposition        Discharge 3/27/2024  8:15 PM  Chelsea Koch discharge to home/self care.              Hannah Khan MD  03/28/24 1785 details…

## 2025-04-25 NOTE — H&P PST ADULT - ENDOCRINE COMMENTS
Hx T2DM, A1c 7.0 on 4/22/2025, PCP started Metformin 500mg daily, pt will start from 4/26/2025. Hx Hypothyroidism

## 2025-04-25 NOTE — H&P PST ADULT - LAST ECHOCARDIOGRAM
9/2024- done as routine-  Left ventricular cavity is normal in size. Left ventricular wall thickness is normal. Left ventricular systolic function is normal with an ejection fraction visually estimated at 60 to 65 %., {copy in chart from [facility]}

## 2025-04-25 NOTE — H&P PST ADULT - LAST STRESS TEST
2021- done as routine- negative for exercise induced ischemia, negative for ischemic ST segment changes, {copy in chart from [facility]}

## 2025-04-25 NOTE — H&P PST ADULT - NSICDXPASTMEDICALHX_GEN_ALL_CORE_FT
PAST MEDICAL HISTORY:  GERD (gastroesophageal reflux disease)     HTN (hypertension)     Hyperlipidemia     Hypothyroidism     Malignant neoplasm of thyroid gland     T2DM (type 2 diabetes mellitus)

## 2025-04-25 NOTE — H&P PST ADULT - PROBLEM SELECTOR PLAN 1
Patient is scheduled for total thyroidectomy with nerve monitoring on 5/5/2025. Pre-op instructions provided. Pt given verbal and written instructions with teach back on chlorhexidine shampoo and Pepcid. Pt verbalized understanding with return demonstration.     CBC, CMP and A1c in [facility] 4/22/2025.  Intermediate risk for WHITNEY identified by screening tool. WHITNEY precautions.    Medical evaluation in [facility] 4/24/2025- "    Pt is in low to moderate risk for this procedure. Pt is cleared for this procedure."

## 2025-04-29 ENCOUNTER — OUTPATIENT (OUTPATIENT)
Dept: OUTPATIENT SERVICES | Facility: HOSPITAL | Age: 70
LOS: 1 days | End: 2025-04-29
Payer: COMMERCIAL

## 2025-04-29 ENCOUNTER — APPOINTMENT (OUTPATIENT)
Dept: ULTRASOUND IMAGING | Facility: IMAGING CENTER | Age: 70
End: 2025-04-29
Payer: MEDICARE

## 2025-04-29 ENCOUNTER — RESULT REVIEW (OUTPATIENT)
Age: 70
End: 2025-04-29

## 2025-04-29 DIAGNOSIS — Z90.710 ACQUIRED ABSENCE OF BOTH CERVIX AND UTERUS: Chronic | ICD-10-CM

## 2025-04-29 DIAGNOSIS — C73 MALIGNANT NEOPLASM OF THYROID GLAND: ICD-10-CM

## 2025-04-29 PROBLEM — I10 ESSENTIAL (PRIMARY) HYPERTENSION: Chronic | Status: ACTIVE | Noted: 2025-04-25

## 2025-04-29 PROBLEM — E11.9 TYPE 2 DIABETES MELLITUS WITHOUT COMPLICATIONS: Chronic | Status: ACTIVE | Noted: 2025-04-25

## 2025-04-29 PROBLEM — E03.9 HYPOTHYROIDISM, UNSPECIFIED: Chronic | Status: ACTIVE | Noted: 2025-04-25

## 2025-04-29 PROBLEM — K21.9 GASTRO-ESOPHAGEAL REFLUX DISEASE WITHOUT ESOPHAGITIS: Chronic | Status: ACTIVE | Noted: 2025-04-25

## 2025-04-29 PROCEDURE — 88173 CYTOPATH EVAL FNA REPORT: CPT | Mod: 26

## 2025-04-29 PROCEDURE — 10005 FNA BX W/US GDN 1ST LES: CPT

## 2025-04-29 PROCEDURE — 88172 CYTP DX EVAL FNA 1ST EA SITE: CPT

## 2025-04-29 PROCEDURE — 88173 CYTOPATH EVAL FNA REPORT: CPT

## 2025-04-30 LAB — NON-GYNECOLOGICAL CYTOLOGY STUDY: SIGNIFICANT CHANGE UP

## 2025-05-02 NOTE — ASU PATIENT PROFILE, ADULT - URINARY CATHETER
Please call patient to advise her that I am ok with her trying Contrave for weight loss.  The prescription has actually brandy sent directly over toe [facility] (she does not have to pick it up to hand carry).    Please advise that she start with just one pill daily with breakfast and then increase by one pill weekly until she is taking 2 pills twice daily. Then she can continue that dose.  Please set her up for a follow up visit in 3 months so we can assess if this is an effective medication for her thanks   no

## 2025-05-05 ENCOUNTER — TRANSCRIPTION ENCOUNTER (OUTPATIENT)
Age: 70
End: 2025-05-05

## 2025-05-05 ENCOUNTER — OUTPATIENT (OUTPATIENT)
Dept: OUTPATIENT SERVICES | Facility: HOSPITAL | Age: 70
LOS: 1 days | End: 2025-05-05
Payer: MEDICARE

## 2025-05-05 ENCOUNTER — APPOINTMENT (OUTPATIENT)
Dept: OTOLARYNGOLOGY | Facility: HOSPITAL | Age: 70
End: 2025-05-05

## 2025-05-05 ENCOUNTER — RESULT REVIEW (OUTPATIENT)
Age: 70
End: 2025-05-05

## 2025-05-05 VITALS
OXYGEN SATURATION: 98 % | SYSTOLIC BLOOD PRESSURE: 128 MMHG | HEART RATE: 82 BPM | RESPIRATION RATE: 16 BRPM | DIASTOLIC BLOOD PRESSURE: 71 MMHG

## 2025-05-05 VITALS
SYSTOLIC BLOOD PRESSURE: 153 MMHG | DIASTOLIC BLOOD PRESSURE: 72 MMHG | RESPIRATION RATE: 16 BRPM | HEIGHT: 63 IN | TEMPERATURE: 98 F | WEIGHT: 179.9 LBS | HEART RATE: 58 BPM | OXYGEN SATURATION: 97 %

## 2025-05-05 DIAGNOSIS — Z90.710 ACQUIRED ABSENCE OF BOTH CERVIX AND UTERUS: Chronic | ICD-10-CM

## 2025-05-05 DIAGNOSIS — C73 MALIGNANT NEOPLASM OF THYROID GLAND: ICD-10-CM

## 2025-05-05 LAB — GLUCOSE BLDC GLUCOMTR-MCNC: 135 MG/DL — HIGH (ref 70–99)

## 2025-05-05 PROCEDURE — 88307 TISSUE EXAM BY PATHOLOGIST: CPT | Mod: 26

## 2025-05-05 PROCEDURE — 60220 PARTIAL REMOVAL OF THYROID: CPT | Mod: GC,RT

## 2025-05-05 DEVICE — CLIP LIG TI SM/WIDE 24/BX: Type: IMPLANTABLE DEVICE | Site: RIGHT | Status: FUNCTIONAL

## 2025-05-05 DEVICE — LIGATING CLIPS WECK HORIZON MEDIUM (BLUE) 24: Type: IMPLANTABLE DEVICE | Site: RIGHT | Status: FUNCTIONAL

## 2025-05-05 DEVICE — CARTRIDGE MICROCLIP 30: Type: IMPLANTABLE DEVICE | Site: RIGHT | Status: FUNCTIONAL

## 2025-05-05 RX ORDER — OXYCODONE HYDROCHLORIDE 30 MG/1
1 TABLET ORAL
Qty: 12 | Refills: 0
Start: 2025-05-05 | End: 2025-05-07

## 2025-05-05 RX ORDER — OXYCODONE HYDROCHLORIDE 30 MG/1
1 TABLET ORAL
Qty: 6 | Refills: 0
Start: 2025-05-05

## 2025-05-05 RX ORDER — SODIUM CHLORIDE 9 G/1000ML
1000 INJECTION, SOLUTION INTRAVENOUS
Refills: 0 | Status: DISCONTINUED | OUTPATIENT
Start: 2025-05-05 | End: 2025-05-05

## 2025-05-05 RX ORDER — HYDROMORPHONE/SOD CHLOR,ISO/PF 2 MG/10 ML
0.5 SYRINGE (ML) INJECTION
Refills: 0 | Status: DISCONTINUED | OUTPATIENT
Start: 2025-05-05 | End: 2025-05-05

## 2025-05-05 RX ORDER — METFORMIN HYDROCHLORIDE 850 MG/1
1 TABLET ORAL
Refills: 0 | DISCHARGE

## 2025-05-05 RX ORDER — LEVOTHYROXINE SODIUM 300 MCG
25 TABLET ORAL DAILY
Refills: 0 | Status: DISCONTINUED | OUTPATIENT
Start: 2025-05-05 | End: 2025-05-19

## 2025-05-05 RX ORDER — HYDROMORPHONE/SOD CHLOR,ISO/PF 2 MG/10 ML
1 SYRINGE (ML) INJECTION
Refills: 0 | Status: DISCONTINUED | OUTPATIENT
Start: 2025-05-05 | End: 2025-05-05

## 2025-05-05 RX ORDER — LEVOTHYROXINE SODIUM 300 MCG
1 TABLET ORAL
Refills: 0 | DISCHARGE

## 2025-05-05 RX ADMIN — Medication 0.5 MILLIGRAM(S): at 10:24

## 2025-05-05 RX ADMIN — Medication 0.5 MILLIGRAM(S): at 10:40

## 2025-05-05 NOTE — ASU DISCHARGE PLAN (ADULT/PEDIATRIC) - NURSING INSTRUCTIONS
You received IV Tylenol for pain management at 10 AM. Please DO NOT take any Tylenol (Acetaminophen) containing products, such as Vicodin, Percocet, Excedrin, and cold medications for the next 6 hours (until 4 PM). DO NOT TAKE MORE THAN 3000 MG OF TYLENOL in a 24 hour period.

## 2025-05-05 NOTE — ASU DISCHARGE PLAN (ADULT/PEDIATRIC) - FINANCIAL ASSISTANCE
[facility] provides services at a reduced cost to those who are determined to be eligible through [facility]’s financial assistance program. Information regarding [facility]’s financial assistance program can be found by going to [email or web address] or by calling [phone].

## 2025-05-05 NOTE — ASU PREOP CHECKLIST - 1.
pt stated in english she wanted her daughter to translate for her . the pt speaks english and Ecuadorean

## 2025-05-08 LAB — SURGICAL PATHOLOGY STUDY: SIGNIFICANT CHANGE UP

## 2025-05-14 ENCOUNTER — APPOINTMENT (OUTPATIENT)
Dept: OTOLARYNGOLOGY | Facility: CLINIC | Age: 70
End: 2025-05-14
Payer: MEDICARE

## 2025-05-14 PROCEDURE — 99024 POSTOP FOLLOW-UP VISIT: CPT

## 2025-05-15 ENCOUNTER — APPOINTMENT (OUTPATIENT)
Dept: ENDOCRINOLOGY | Facility: CLINIC | Age: 70
End: 2025-05-15
Payer: MEDICARE

## 2025-05-15 VITALS
HEART RATE: 74 BPM | OXYGEN SATURATION: 97 % | SYSTOLIC BLOOD PRESSURE: 136 MMHG | BODY MASS INDEX: 30.38 KG/M2 | DIASTOLIC BLOOD PRESSURE: 78 MMHG | TEMPERATURE: 96.8 F | WEIGHT: 177 LBS | RESPIRATION RATE: 16 BRPM

## 2025-05-15 DIAGNOSIS — E78.2 MIXED HYPERLIPIDEMIA: ICD-10-CM

## 2025-05-15 DIAGNOSIS — E03.9 HYPOTHYROIDISM, UNSPECIFIED: ICD-10-CM

## 2025-05-15 DIAGNOSIS — E11.9 TYPE 2 DIABETES MELLITUS W/OUT COMPLICATIONS: ICD-10-CM

## 2025-05-15 DIAGNOSIS — E04.1 NONTOXIC SINGLE THYROID NODULE: ICD-10-CM

## 2025-05-15 DIAGNOSIS — C73 MALIGNANT NEOPLASM OF THYROID GLAND: ICD-10-CM

## 2025-05-15 DIAGNOSIS — M85.80 OTHER SPECIFIED DISORDERS OF BONE DENSITY AND STRUCTURE, UNSPECIFIED SITE: ICD-10-CM

## 2025-05-15 DIAGNOSIS — Z85.850 PERSONAL HISTORY OF MALIGNANT NEOPLASM OF THYROID: ICD-10-CM

## 2025-05-15 PROCEDURE — G2211 COMPLEX E/M VISIT ADD ON: CPT

## 2025-05-15 PROCEDURE — 99204 OFFICE O/P NEW MOD 45 MIN: CPT

## 2025-06-14 ENCOUNTER — LABORATORY RESULT (OUTPATIENT)
Age: 70
End: 2025-06-14

## 2025-06-17 ENCOUNTER — APPOINTMENT (OUTPATIENT)
Dept: OTOLARYNGOLOGY | Facility: CLINIC | Age: 70
End: 2025-06-17
Payer: MEDICARE

## 2025-06-17 VITALS
OXYGEN SATURATION: 98 % | BODY MASS INDEX: 30.22 KG/M2 | WEIGHT: 177 LBS | HEART RATE: 57 BPM | SYSTOLIC BLOOD PRESSURE: 138 MMHG | HEIGHT: 64 IN | DIASTOLIC BLOOD PRESSURE: 82 MMHG

## 2025-06-17 PROCEDURE — 99024 POSTOP FOLLOW-UP VISIT: CPT

## 2025-06-25 ENCOUNTER — APPOINTMENT (OUTPATIENT)
Dept: FAMILY MEDICINE | Facility: CLINIC | Age: 70
End: 2025-06-25
Payer: MEDICARE

## 2025-06-25 VITALS
HEART RATE: 94 BPM | OXYGEN SATURATION: 98 % | WEIGHT: 182 LBS | BODY MASS INDEX: 31.07 KG/M2 | SYSTOLIC BLOOD PRESSURE: 123 MMHG | DIASTOLIC BLOOD PRESSURE: 67 MMHG | TEMPERATURE: 97.2 F | HEIGHT: 64 IN

## 2025-06-25 PROCEDURE — G2211 COMPLEX E/M VISIT ADD ON: CPT

## 2025-06-25 PROCEDURE — 99214 OFFICE O/P EST MOD 30 MIN: CPT

## 2025-07-02 ENCOUNTER — EMERGENCY (EMERGENCY)
Facility: HOSPITAL | Age: 70
LOS: 1 days | End: 2025-07-02
Attending: STUDENT IN AN ORGANIZED HEALTH CARE EDUCATION/TRAINING PROGRAM
Payer: MEDICARE

## 2025-07-02 VITALS
RESPIRATION RATE: 18 BRPM | TEMPERATURE: 98 F | OXYGEN SATURATION: 97 % | HEIGHT: 63 IN | HEART RATE: 56 BPM | DIASTOLIC BLOOD PRESSURE: 78 MMHG | SYSTOLIC BLOOD PRESSURE: 155 MMHG

## 2025-07-02 DIAGNOSIS — Z90.710 ACQUIRED ABSENCE OF BOTH CERVIX AND UTERUS: Chronic | ICD-10-CM

## 2025-07-02 PROCEDURE — 99284 EMERGENCY DEPT VISIT MOD MDM: CPT

## 2025-07-02 RX ORDER — ACETAMINOPHEN 500 MG/5ML
975 LIQUID (ML) ORAL ONCE
Refills: 0 | Status: COMPLETED | OUTPATIENT
Start: 2025-07-02 | End: 2025-07-02

## 2025-07-02 RX ADMIN — Medication 975 MILLIGRAM(S): at 23:59

## 2025-07-03 VITALS
HEART RATE: 51 BPM | OXYGEN SATURATION: 95 % | TEMPERATURE: 98 F | DIASTOLIC BLOOD PRESSURE: 74 MMHG | RESPIRATION RATE: 18 BRPM | SYSTOLIC BLOOD PRESSURE: 136 MMHG

## 2025-07-03 PROCEDURE — 90471 IMMUNIZATION ADMIN: CPT

## 2025-07-03 PROCEDURE — 99284 EMERGENCY DEPT VISIT MOD MDM: CPT | Mod: 25

## 2025-07-03 PROCEDURE — 70450 CT HEAD/BRAIN W/O DYE: CPT | Mod: 26

## 2025-07-03 PROCEDURE — 72125 CT NECK SPINE W/O DYE: CPT | Mod: 26

## 2025-07-03 PROCEDURE — 70486 CT MAXILLOFACIAL W/O DYE: CPT | Mod: 26

## 2025-07-03 PROCEDURE — 72125 CT NECK SPINE W/O DYE: CPT

## 2025-07-03 PROCEDURE — 70450 CT HEAD/BRAIN W/O DYE: CPT

## 2025-07-03 PROCEDURE — 70486 CT MAXILLOFACIAL W/O DYE: CPT

## 2025-07-03 PROCEDURE — 90715 TDAP VACCINE 7 YRS/> IM: CPT

## 2025-07-03 RX ORDER — AMOXICILLIN AND CLAVULANATE POTASSIUM 500; 125 MG/1; MG/1
1 TABLET, FILM COATED ORAL
Qty: 14 | Refills: 0
Start: 2025-07-03 | End: 2025-07-09

## 2025-07-03 RX ORDER — AMOXICILLIN AND CLAVULANATE POTASSIUM 500; 125 MG/1; MG/1
1 TABLET, FILM COATED ORAL ONCE
Refills: 0 | Status: COMPLETED | OUTPATIENT
Start: 2025-07-03 | End: 2025-07-03

## 2025-07-03 RX ADMIN — AMOXICILLIN AND CLAVULANATE POTASSIUM 1 TABLET(S): 500; 125 TABLET, FILM COATED ORAL at 03:37

## 2025-07-03 RX ADMIN — Medication 975 MILLIGRAM(S): at 01:52

## (undated) DEVICE — VENODYNE/SCD SLEEVE CALF MEDIUM

## (undated) DEVICE — DRAPE INSTRUMENT POUCH 6.75" X 11"

## (undated) DEVICE — ELCTR BOVIE PENCIL SMOKE EVACUATION

## (undated) DEVICE — SUT SILK 2-0 24" TIES

## (undated) DEVICE — SAFETY PIN

## (undated) DEVICE — WARMING BLANKET LOWER ADULT

## (undated) DEVICE — DRAIN RESERVOIR FOR JACKSON PRATT 100CC CARDINAL

## (undated) DEVICE — LIJ-ESU BOVIE MACHINE - ROBOTIC 11469375: Type: DURABLE MEDICAL EQUIPMENT

## (undated) DEVICE — DRAPE MAGNETIC INSTRUMENT MEDIUM

## (undated) DEVICE — SUT SILK 3-0 18" TIES

## (undated) DEVICE — SUT SILK 2-0 18" FS

## (undated) DEVICE — SUT VICRYL 4-0 27" RB-1 UNDYED

## (undated) DEVICE — PREP BETADINE KIT

## (undated) DEVICE — GLV 7 PROTEXIS (WHITE)

## (undated) DEVICE — DRAPE TOWEL BLUE 17" X 24"

## (undated) DEVICE — DRSG BENZOIN 0.6CC

## (undated) DEVICE — DRSG BIOPATCH DISK W CHG 1" W 4.0MM HOLE

## (undated) DEVICE — BIPOLAR FORCEP KIRWAN JEWELERS STR 4" X 0.4MM W 12FT CORD (GREEN)

## (undated) DEVICE — NDL HYPO SAFE 22G X 1" (BLACK)

## (undated) DEVICE — STAPLER SKIN VISI-STAT 35 WIDE

## (undated) DEVICE — PACK HEAD & NECK

## (undated) DEVICE — DRSG CURITY GAUZE SPONGE 4 X 4" 12-PLY

## (undated) DEVICE — NERVE STIMULATOR/LOCATOR HEAD AND NECK MODEL

## (undated) DEVICE — SUT SILK 2-0 30" SH

## (undated) DEVICE — DRAPE 3/4 SHEET 52X76"

## (undated) DEVICE — ELCTR GROUNDING PAD ADULT COVIDIEN

## (undated) DEVICE — DRAPE SPLIT SHEET 77" X 120"

## (undated) DEVICE — DRAIN JACKSON PRATT 7FR ROUND END NO TROCAR

## (undated) DEVICE — CANISTER DISPOSABLE THIN WALL 3000CC

## (undated) DEVICE — SOL IRR POUR H2O 500ML

## (undated) DEVICE — LONE STAR RETRACTOR RING 12MM BLUNT DISP

## (undated) DEVICE — LABELS BLANK W PEN

## (undated) DEVICE — DRAPE WARMING SOLUTION 44 X 44"

## (undated) DEVICE — DRSG STERISTRIPS 0.25 X 3"

## (undated) DEVICE — SYR LUER LOK 5CC

## (undated) DEVICE — RUBBER BANDS STERILE

## (undated) DEVICE — SUT ETHILON 6-0 18" PS-3